# Patient Record
Sex: FEMALE | Race: WHITE | NOT HISPANIC OR LATINO | Employment: UNEMPLOYED | URBAN - METROPOLITAN AREA
[De-identification: names, ages, dates, MRNs, and addresses within clinical notes are randomized per-mention and may not be internally consistent; named-entity substitution may affect disease eponyms.]

---

## 2017-05-08 ENCOUNTER — OFFICE VISIT (OUTPATIENT)
Dept: FAMILY MEDICINE | Age: 27
End: 2017-05-08

## 2017-05-08 VITALS
TEMPERATURE: 98.2 F | HEIGHT: 65 IN | OXYGEN SATURATION: 98 % | BODY MASS INDEX: 19.32 KG/M2 | HEART RATE: 83 BPM | DIASTOLIC BLOOD PRESSURE: 62 MMHG | WEIGHT: 115.96 LBS | SYSTOLIC BLOOD PRESSURE: 98 MMHG

## 2017-05-08 DIAGNOSIS — G43.719 INTRACTABLE CHRONIC MIGRAINE WITHOUT AURA AND WITHOUT STATUS MIGRAINOSUS: ICD-10-CM

## 2017-05-08 DIAGNOSIS — M54.2 CHRONIC NECK PAIN: ICD-10-CM

## 2017-05-08 DIAGNOSIS — F41.9 ANXIETY: ICD-10-CM

## 2017-05-08 DIAGNOSIS — G89.29 CHRONIC NECK PAIN: ICD-10-CM

## 2017-05-08 DIAGNOSIS — F84.0 AUTISM SPECTRUM DISORDER: ICD-10-CM

## 2017-05-08 DIAGNOSIS — K59.09 CHRONIC CONSTIPATION: Primary | ICD-10-CM

## 2017-05-08 DIAGNOSIS — F51.04 CHRONIC INSOMNIA: ICD-10-CM

## 2017-05-08 DIAGNOSIS — Q67.5 CONGENITAL SCOLIOSIS: ICD-10-CM

## 2017-05-08 PROCEDURE — 99204 OFFICE O/P NEW MOD 45 MIN: CPT | Performed by: NURSE PRACTITIONER

## 2017-05-08 RX ORDER — VENLAFAXINE HYDROCHLORIDE 37.5 MG/1
37.5 CAPSULE, EXTENDED RELEASE ORAL DAILY
Qty: 30 CAPSULE | Refills: 1 | Status: SHIPPED | OUTPATIENT
Start: 2017-05-08 | End: 2017-07-26 | Stop reason: ALTCHOICE

## 2017-05-08 RX ORDER — AMITRIPTYLINE HYDROCHLORIDE 10 MG/1
10 TABLET, FILM COATED ORAL NIGHTLY
Qty: 30 TABLET | Refills: 0 | Status: SHIPPED | OUTPATIENT
Start: 2017-05-08 | End: 2017-05-08 | Stop reason: CLARIF

## 2017-05-15 ENCOUNTER — E-ADVICE (OUTPATIENT)
Dept: FAMILY MEDICINE | Age: 27
End: 2017-05-15

## 2017-06-06 ENCOUNTER — TELEPHONE (OUTPATIENT)
Dept: FAMILY MEDICINE | Age: 27
End: 2017-06-06

## 2017-07-26 ENCOUNTER — OFFICE VISIT (OUTPATIENT)
Dept: FAMILY MEDICINE | Age: 27
End: 2017-07-26

## 2017-07-26 VITALS
HEART RATE: 72 BPM | WEIGHT: 117.73 LBS | BODY MASS INDEX: 19.85 KG/M2 | DIASTOLIC BLOOD PRESSURE: 42 MMHG | SYSTOLIC BLOOD PRESSURE: 112 MMHG

## 2017-07-26 DIAGNOSIS — K59.09 CHRONIC CONSTIPATION: Primary | ICD-10-CM

## 2017-07-26 DIAGNOSIS — F41.9 ANXIETY: ICD-10-CM

## 2017-07-26 PROCEDURE — 99215 OFFICE O/P EST HI 40 MIN: CPT | Performed by: NURSE PRACTITIONER

## 2017-07-26 RX ORDER — BISACODYL 10 MG
10 SUPPOSITORY, RECTAL RECTAL DAILY PRN
Qty: 12 EACH | Refills: 0 | Status: SHIPPED | OUTPATIENT
Start: 2017-07-26

## 2017-07-26 RX ORDER — POLYETHYLENE GLYCOL 3350 17 G/17G
17 POWDER, FOR SOLUTION ORAL DAILY
Qty: 1 BOTTLE | Refills: 5 | Status: SHIPPED | OUTPATIENT
Start: 2017-07-26

## 2019-04-19 ENCOUNTER — HEALTH MAINTENANCE LETTER (OUTPATIENT)
Age: 29
End: 2019-04-19

## 2019-05-01 ENCOUNTER — TELEPHONE (OUTPATIENT)
Dept: SCHEDULING | Age: 29
End: 2019-05-01

## 2019-05-09 ENCOUNTER — OFFICE VISIT (OUTPATIENT)
Dept: FAMILY MEDICINE | Facility: CLINIC | Age: 29
End: 2019-05-09
Payer: MEDICAID

## 2019-05-09 VITALS
TEMPERATURE: 98.7 F | WEIGHT: 112.5 LBS | OXYGEN SATURATION: 98 % | RESPIRATION RATE: 16 BRPM | HEIGHT: 62 IN | DIASTOLIC BLOOD PRESSURE: 47 MMHG | HEART RATE: 84 BPM | BODY MASS INDEX: 20.7 KG/M2 | SYSTOLIC BLOOD PRESSURE: 106 MMHG

## 2019-05-09 DIAGNOSIS — H61.23 CERUMINOSIS, BILATERAL: Primary | ICD-10-CM

## 2019-05-09 DIAGNOSIS — Z86.69 H/O ABSENCE SEIZURES: ICD-10-CM

## 2019-05-09 DIAGNOSIS — R15.9 INCONTINENCE OF FECES, UNSPECIFIED FECAL INCONTINENCE TYPE: ICD-10-CM

## 2019-05-09 DIAGNOSIS — Z86.69 HISTORY OF MIGRAINE HEADACHES: ICD-10-CM

## 2019-05-09 RX ORDER — POLYETHYLENE GLYCOL 3350 17 G/17G
1 POWDER, FOR SOLUTION ORAL DAILY
COMMUNITY
End: 2019-07-24

## 2019-05-09 RX ORDER — BISMUTH SUBSALICYLATE 262 MG/1
1 TABLET, CHEWABLE ORAL
Status: ON HOLD | COMMUNITY
End: 2019-07-28

## 2019-05-09 SDOH — HEALTH STABILITY: MENTAL HEALTH: HOW OFTEN DO YOU HAVE A DRINK CONTAINING ALCOHOL?: NEVER

## 2019-05-09 ASSESSMENT — MIFFLIN-ST. JEOR: SCORE: 1203.31

## 2019-05-09 NOTE — NURSING NOTE
bilateral ear lavage with small  wax removed. Patient tolerated procedure well.    Falguni Patel, CMA

## 2019-05-09 NOTE — NURSING NOTE
"28 year old  Chief Complaint   Patient presents with     Ear Problem     Ringing in left ear, ongoing, constant       Blood pressure 106/47, pulse 84, temperature 98.7  F (37.1  C), temperature source Oral, resp. rate 16, height 1.582 m (5' 2.3\"), weight 51 kg (112 lb 8 oz), SpO2 98 %. Body mass index is 20.38 kg/m .  BP completed using cuff size:    There is no problem list on file for this patient.      Wt Readings from Last 2 Encounters:   05/09/19 51 kg (112 lb 8 oz)     BP Readings from Last 3 Encounters:   05/09/19 106/47       Allergies   Allergen Reactions     Seasonal Allergies        Current Outpatient Medications   Medication     bismuth subsalicylate (PEPTO BISMOL) 262 MG chewable tablet     polyethylene glycol (MIRALAX/GLYCOLAX) powder     No current facility-administered medications for this visit.        Social History     Tobacco Use     Smoking status: Never Smoker     Smokeless tobacco: Never Used   Substance Use Topics     Alcohol use: Not on file     Drug use: Not on file       Honoring Choices - Health Care Directive Guide offered to patient at time of visit.    Health Maintenance Due   Topic Date Due     PREVENTIVE CARE VISIT  1990     HIV SCREEN (SYSTEM ASSIGNED)  11/03/2009     PAP SCREENING Q3 YR (SYSTEM ASSIGNED)  11/03/2012     DTAP/TDAP/TD IMMUNIZATION (1 - Tdap) 11/03/2016     PHQ-2  01/01/2019         There is no immunization history on file for this patient.    No results found for: PAP      No lab results found.    No flowsheet data found.    No flowsheet data found.    No flowsheet data found.    No flowsheet data found.      Falguni Patel, [facility]  May 9, 2019 1:08 PM    "

## 2019-05-15 NOTE — PROGRESS NOTES
"Brianne Colunga is a 28 year old female who presents today to establish care and to discuss several concerns.  She is a FT performing arts student at the .  She recently moved into an apartment near the  with roommates, that is going fairly well.      1.)  Brianne states she has had ongoing constant ringing in her right ear, when this has happened in the past she knows that she has an accumulation of ear wax.  She has had her ears irrigated with success in the past.      2.)  She complains of frequent nausea with a history of IBS, she has episodes of alternating constipation and diarrhea and fecal incontinence.  She has had GI consult in the past, has recently moved to [place] and would like to follow up with a care provider for that. She has had an upper endoscopy and a colonoscopy, those records could be made available.      3.)  Brianne gets a migraine headache about once/week, she treats with OTC Excedrin Migraine medication.  She also has what she calls absence seizures, she is not currently using any medications for seizures, she has \"almost fainted\" but otherwise does not currently have significant concerns for the seizures.  She would like to see a neurologist to make sure that there is nothing she should be doing differently for her headaches or her seizure concerns.        Review Of Systems   ROS: 10 point ROS neg other than the symptoms noted above in the HPI.  Neurologic: History of birth trauma, absence seizures, migraine headaches  Psychiatric: Review of chart from [place]:  Autism, Aspergers, Bipolar  :  Menses every month, she has occasionally skipped a month here and there, she is not sexually active at this time.  LMP 4/16/19.  This lasted 2 days.    Past Medical History:   Diagnosis Date     Acid reflux      Irritable bowel syndrome      No past surgical history on file.  Social History     Socioeconomic History     Marital status: Single     Spouse name: Not on file     Number of children: " "Not on file     Years of education: Not on file     Highest education level: Not on file   Occupational History     Not on file   Social Needs     Financial resource strain: Not on file     Food insecurity:     Worry: Not on file     Inability: Not on file     Transportation needs:     Medical: Not on file     Non-medical: Not on file   Tobacco Use     Smoking status: Never Smoker     Smokeless tobacco: Never Used   Substance and Sexual Activity     Alcohol use: Never     Frequency: Never     Drug use: Never     Sexual activity: Not Currently     Partners: Male   Lifestyle     Physical activity:     Days per week: Not on file     Minutes per session: Not on file     Stress: Not on file   Relationships     Social connections:     Talks on phone: Not on file     Gets together: Not on file     Attends Rastafarian service: Not on file     Active member of club or organization: Not on file     Attends meetings of clubs or organizations: Not on file     Relationship status: Not on file     Intimate partner violence:     Fear of current or ex partner: Not on file     Emotionally abused: Not on file     Physically abused: Not on file     Forced sexual activity: Not on file   Other Topics Concern     Not on file   Social History Narrative     Not on file     Family History   Problem Relation Age of Onset     Irritable Bowel Syndrome Mother      Heart Disease Father      Prostate Cancer Paternal Grandfather        /47   Pulse 84   Temp 98.7  F (37.1  C) (Oral)   Resp 16   Ht 1.582 m (5' 2.3\")   Wt 51 kg (112 lb 8 oz)   SpO2 98%   BMI 20.38 kg/m      Exam:  Constitutional: healthy, alert and no distress, poor posture  Head: Normocephalic. No masses, lesions, tenderness or abnormalities  Neck: Neck supple. No adenopathy. Thyroid symmetric, normal size,, Carotids without bruits.  ENT: Cerumen noted both ear canals, greater on the right  Cardiovascular: negative, PMI normal. No lifts, heaves, or thrills. RRR. No " murmurs, clicks gallops or rub  Respiratory: negative, Percussion normal. Good diaphragmatic excursion. Lungs clear  : Deferred  Musculoskeletal: Poor posture  Skin: no suspicious lesions or rashes  Psychiatric: affect normal/bright and mentation appears abnormal in her overzealous response, constant smiling, yet when questioned very articulate and knowledgeable about her health and conditions    Assessment/Plan:  1. Ceruminosis, bilateral    - REMOVE IMPACTED CERUMEN    2. H/O absence seizures    - NEUROLOGY ADULT REFERRAL    3. Incontinence of feces, unspecified fecal incontinence type    - GASTROENTEROLOGY ADULT REF CONSULT ONLY    4. History of migraine headaches    - NEUROLOGY ADULT REFERRAL    Brianne will return to this clinic as needed.

## 2019-05-20 NOTE — TELEPHONE ENCOUNTER
FUTURE VISIT INFORMATION      FUTURE VISIT INFORMATION:    Date: 6/20/19    Time: 7:30AM    Location: [facility]  REFERRAL INFORMATION:    Referring provider:  Roint Parekh NP    Referring providers clinic:  [facility]    Reason for visit/diagnosis:  Incontinence of feces, unspecified fecal incontinence type    NOTES STATUS DETAILS   OFFICE NOTE from referring provider  Internal 5/9/19   OFFICE NOTE from other specialist   Care Everywhere 4/30/19, 4/9/19, 4/4/19, 3/29/19, 2/21/19, 2/11/19, 2/6/19, 1/29/19, 1/15/19,    DISCHARGE SUMMARY FROM HOSPITAL N/A    DISCHARGE REPORT FROM ED N/A    OPERATIVE REPORT  N/A    PFC REPORT N/A    MEDICATION LIST Internal    LABS     FIT/STOOL TESTING N/A    ANAL PAP N/A    PATHOLOGY REPORTS RELATED TO DIAGNOSIS N/A    DIAGNOSTIC PROCEDURES     COLONOSCOPY N/A    ENDOSCOPY (EGD) N/A    ERCP N/A    ANOSCOPY N/A    FLEX SIGMOIDOSCOPY N/A    IMAGING & REPORT      CT, MRI, US, XR N/A

## 2019-05-28 ENCOUNTER — OFFICE VISIT (OUTPATIENT)
Dept: ORTHOPEDICS | Facility: CLINIC | Age: 29
End: 2019-05-28
Payer: MEDICAID

## 2019-05-28 ENCOUNTER — OFFICE VISIT (OUTPATIENT)
Dept: FAMILY MEDICINE | Facility: CLINIC | Age: 29
End: 2019-05-28
Payer: MEDICAID

## 2019-05-28 VITALS
SYSTOLIC BLOOD PRESSURE: 117 MMHG | BODY MASS INDEX: 20.61 KG/M2 | WEIGHT: 112 LBS | HEART RATE: 76 BPM | HEIGHT: 62 IN | DIASTOLIC BLOOD PRESSURE: 68 MMHG | RESPIRATION RATE: 16 BRPM

## 2019-05-28 VITALS
OXYGEN SATURATION: 99 % | DIASTOLIC BLOOD PRESSURE: 84 MMHG | SYSTOLIC BLOOD PRESSURE: 120 MMHG | WEIGHT: 114.6 LBS | RESPIRATION RATE: 16 BRPM | HEIGHT: 62 IN | BODY MASS INDEX: 21.09 KG/M2 | HEART RATE: 69 BPM | TEMPERATURE: 98.6 F

## 2019-05-28 DIAGNOSIS — H10.31 ACUTE CONJUNCTIVITIS OF RIGHT EYE, UNSPECIFIED ACUTE CONJUNCTIVITIS TYPE: Primary | ICD-10-CM

## 2019-05-28 DIAGNOSIS — M25.562 ACUTE PAIN OF LEFT KNEE: Primary | ICD-10-CM

## 2019-05-28 DIAGNOSIS — H01.003 BLEPHARITIS OF EYELID OF RIGHT EYE, UNSPECIFIED EYELID, UNSPECIFIED TYPE: ICD-10-CM

## 2019-05-28 RX ORDER — SULFACETAMIDE/PREDNISOLONE 10 %-0.2 %
SUSPENSION, DROPS(FINAL DOSAGE FORM)(ML) OPHTHALMIC (EYE)
Qty: 10 ML | Refills: 0 | Status: SHIPPED | OUTPATIENT
Start: 2019-05-28 | End: 2019-07-23

## 2019-05-28 RX ORDER — TOBRAMYCIN AND DEXAMETHASONE 3; 1 MG/ML; MG/ML
1-2 SUSPENSION/ DROPS OPHTHALMIC EVERY 4 HOURS
Qty: 3 ML | Refills: 0 | Status: SHIPPED | OUTPATIENT
Start: 2019-05-28 | End: 2019-06-20

## 2019-05-28 ASSESSMENT — PAIN SCALES - GENERAL: PAINLEVEL: NO PAIN (0)

## 2019-05-28 ASSESSMENT — MIFFLIN-ST. JEOR
SCORE: 1201.04
SCORE: 1212.83

## 2019-05-28 NOTE — PROGRESS NOTES
"[facility]  Orthopedics  Antonio Cochran, DO  2019     Name: Brianne Colunga  MRN: [id number]  Age: 28 year old  : 1990  Referring provider: Referred Self     Chief Complaint: Pain of the Left Knee     Date of Injury: 19    History of Present Illness:   Brianne Colunga is a 28 year old female PMHx of autism who presents today for evaluation of left knee pain. She reports that her knee pain began today after bumping it onto a \"sturdy object.\" She heard a pop and had difficulty walking immediately afterwards. Able to ambulate however shortly thereafter.  Symptoms are made worse with walking. Describes pain as \"aching.\" She has tried ice and rest to alleviate her pain.     Review of Systems:   A 10-point review of systems was obtained and is negative except for as noted in the HPI.     Medications:     Current Outpatient Medications:      bismuth subsalicylate (PEPTO BISMOL) 262 MG chewable tablet, Take 1 tablet by mouth 4 times daily (before meals and nightly), Disp: , Rfl:      polyethylene glycol (MIRALAX/GLYCOLAX) powder, Take 1 capful by mouth daily, Disp: , Rfl:     Allergies:  Seasonal allergies    Past Medical History:  Acid reflux  Irritable bowel syndrome  Asperger's Syndrome  ADHD  Anxiety  Bipolar Disorder  Suicidal Ideations    Past Surgical History:  The patient does not have any pertinent past surgical history.    Social History:  The patient denies tobacco or alcohol use.  Occupation: Student - [employer]  Handedness: Left  Exercise: Running, bike in the weightroom    Family History:  Mother: Irritable bowel syndrome  Father: Heart disease  Paternal grandfather: Prostate cancer      Physical Examination:  Blood pressure 117/68, pulse 76, resp. rate 16, height 1.582 m (5' 2.3\"), weight 50.8 kg (112 lb).  General  - normal appearance, in no obvious distress  CV  - normal popliteal pulse  Pulm  - normal respiratory pattern, non-labored  Musculoskeletal - left knee  - stance: normal gait without " limp, normal single leg squat, no obvious leg length discrepancy  - inspection: normal bone and joint alignment, no obvious deformity. Mild swelling at the medial femoral condyle, just medial to the superior poll of the patella. Small amount of ecchymosis in this region.   - palpation: no joint line tenderness, patella and patellar tendon non-tender, Tenderness just medial and superior to the patella overlying VMO.   - ROM: 135 degrees flexion, -5 degrees extension, not painful, normal actively and passively compared to contralateral  - strength: 5/5 in flexion, 5/5 in extension  - special tests:  (-) Lachman  (-) varus at 0 and 30 degrees flexion  (-) valgus at 0 and 30 degrees flexion  Neuro  - no sensory or motor deficit, grossly normal coordination, normal muscle tone  Skin  - no ecchymosis, erythema, warmth, or induration, no obvious rash  Psych  - interactive, appropriate, normal mood and affect    Assessment:   28 year old female presenting with left anterior knee pain consistent with contusion of patella.  No XR warranted today based on examination and soft tissue location in quad.    Plan:     I recommend she treat her symptoms with a Tubigrip sleeve    I recommend she manage her pain and swelling with ice and elevation.     Tylenol/ibuprofen use    Activity as tolerated    Follow-up if her symptoms fail to improve.     I, Antonio Cochran DO, have reviewed the above note and agree with the scribe's notation as written.    Scribe Disclosure:  IBrandon, am serving as a scribe to document services personally performed by Antonio Cochran DO at this visit, based upon the provider's statements to me. All documentation has been reviewed by the aforementioned provider prior to being entered into the official medical record.

## 2019-05-28 NOTE — PATIENT INSTRUCTIONS

## 2019-05-28 NOTE — PROGRESS NOTES
SPORTS & ORTHOPEDIC WALK-IN VISIT 5/28/2019    Primary Care Physician: Dr. 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  left knee    What caused the injury /pain? Ran into something sturdy outside    How long ago did your injury occur or pain begin? today    What are your most bothersome symptoms? Pain and Swelling    How would you characterize your symptom?  aching    What makes your symptoms better? Rest and Ice    What makes your symptoms worse? Walking    Have you been previously seen for this problem? No    Medical History:    Any recent changes to your medical history? No    Any new medication prescribed since last visit? No    Have you had surgery on this body part before? No    Social History:    Occupation: Student - [employer]    Handedness: Left    Exercise: Running, bike in the weightroom    Review of Systems:    Do you have fever, chills, weight loss? No    Do you have any vision problems? No    Do you have any chest pain or edema? No    Do you have any shortness of breath or wheezing?  No    Do you have stomach problems? Sometimes, nausea    Do you have any numbness or focal weakness? No    Do you have diabetes? No    Do you have problems with bleeding or clotting? No    Do you have an rashes or other skin lesions? No

## 2019-05-28 NOTE — LETTER
"2019       RE: Brianne Colunga  [address]  [facility] [address]     Dear Colleague,    Thank you for referring your patient, Brianne Colunga, to the [facility] SPORTS AND ORTHOPAEDIC WALK IN CLINIC at [facility]. Please see a copy of my visit note below.    [facility]  Orthopedics  JaiPatrizia Cochran, DO  2019     Name: Brianne Colunga  MRN: [id number]  Age: 28 year old  : 1990  Referring provider: Referred Self     Chief Complaint: Pain of the Left Knee     Date of Injury: 19    History of Present Illness:   Brianne Colunga is a 28 year old female PMHx of autism who presents today for evaluation of left knee pain. She reports that her knee pain began today after bumping it onto a \"sturdy object.\" She heard a pop and had difficulty walking immediately afterwards. Able to ambulate however shortly thereafter.  Symptoms are made worse with walking. Describes pain as \"aching.\" She has tried ice and rest to alleviate her pain.     Review of Systems:   A 10-point review of systems was obtained and is negative except for as noted in the HPI.     Medications:     Current Outpatient Medications:      bismuth subsalicylate (PEPTO BISMOL) 262 MG chewable tablet, Take 1 tablet by mouth 4 times daily (before meals and nightly), Disp: , Rfl:      polyethylene glycol (MIRALAX/GLYCOLAX) powder, Take 1 capful by mouth daily, Disp: , Rfl:     Allergies:  Seasonal allergies    Past Medical History:  Acid reflux  Irritable bowel syndrome  Asperger's Syndrome  ADHD  Anxiety  Bipolar Disorder  Suicidal Ideations    Past Surgical History:  The patient does not have any pertinent past surgical history.    Social History:  The patient denies tobacco or alcohol use.  Occupation: Student - [employer]  Handedness: Left  Exercise: Running, bike in the weightroom    Family History:  Mother: Irritable bowel syndrome  Father: Heart disease  Paternal grandfather: Prostate " "cancer      Physical Examination:  Blood pressure 117/68, pulse 76, resp. rate 16, height 1.582 m (5' 2.3\"), weight 50.8 kg (112 lb).  General  - normal appearance, in no obvious distress  CV  - normal popliteal pulse  Pulm  - normal respiratory pattern, non-labored  Musculoskeletal - left knee  - stance: normal gait without limp, normal single leg squat, no obvious leg length discrepancy  - inspection: normal bone and joint alignment, no obvious deformity. Mild swelling at the medial femoral condyle, just medial to the superior poll of the patella. Small amount of ecchymosis in this region.   - palpation: no joint line tenderness, patella and patellar tendon non-tender, Tenderness just medial and superior to the patella overlying VMO.   - ROM: 135 degrees flexion, -5 degrees extension, not painful, normal actively and passively compared to contralateral  - strength: 5/5 in flexion, 5/5 in extension  - special tests:  (-) Lachman  (-) varus at 0 and 30 degrees flexion  (-) valgus at 0 and 30 degrees flexion  Neuro  - no sensory or motor deficit, grossly normal coordination, normal muscle tone  Skin  - no ecchymosis, erythema, warmth, or induration, no obvious rash  Psych  - interactive, appropriate, normal mood and affect    Assessment:   28 year old female presenting with left anterior knee pain consistent with contusion of patella.  No XR warranted today based on examination and soft tissue location in quad.    Plan:     I recommend she treat her symptoms with a Tubigrip sleeve    I recommend she manage her pain and swelling with ice and elevation.     Tylenol/ibuprofen use    Activity as tolerated    Follow-up if her symptoms fail to improve.     I, Antonio Cochran DO, have reviewed the above note and agree with the scribe's notation as written.    Scribe Disclosure:  IBrandon, am serving as a scribe to document services personally performed by Antonio Cochran DO at this visit, based upon the provider's " statements to me. All documentation has been reviewed by the aforementioned provider prior to being entered into the official medical record.              SPORTS & ORTHOPEDIC WALK-IN VISIT 5/28/2019    Primary Care Physician: Dr. 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  left knee    What caused the injury /pain? Ran into something sturdy outside    How long ago did your injury occur or pain begin? today    What are your most bothersome symptoms? Pain and Swelling    How would you characterize your symptom?  aching    What makes your symptoms better? Rest and Ice    What makes your symptoms worse? Walking    Have you been previously seen for this problem? No    Medical History:    Any recent changes to your medical history? No    Any new medication prescribed since last visit? No    Have you had surgery on this body part before? No    Social History:    Occupation: Student - [employer]    Handedness: Left    Exercise: Running, bike in the weightroom    Review of Systems:    Do you have fever, chills, weight loss? No    Do you have any vision problems? No    Do you have any chest pain or edema? No    Do you have any shortness of breath or wheezing?  No    Do you have stomach problems? Sometimes, nausea    Do you have any numbness or focal weakness? No    Do you have diabetes? No    Do you have problems with bleeding or clotting? No    Do you have an rashes or other skin lesions? No         Again, thank you for allowing me to participate in the care of your patient.      Sincerely,    Antonio Cochran, DO

## 2019-05-28 NOTE — NURSING NOTE
Chief Complaint   Patient presents with     Eye Problem     Pt complains of right eye discomfort for the past couple weeks.         Gurjit Haskins, EMT on 5/28/2019 at 3:36 PM

## 2019-05-28 NOTE — PROGRESS NOTES
Brianne Colunga is a 28 year old female who presents today for a painful itchy right eye.  She does not remember an injury or other contact.  Her vision is not impacted.  She noticed this about the middle of the month, and now she has crusty eyes in the morning and drainage at times, creamy colored, during the day.  No fever, no symptoms of URI, no pain.  She states she does good hand hygiene.      Review Of Systems   ROS: 10 point ROS neg other than the symptoms noted above in the HPI.      Past Medical History:   Diagnosis Date     Acid reflux      Irritable bowel syndrome      No past surgical history on file.  Social History     Socioeconomic History     Marital status: Single     Spouse name: Not on file     Number of children: Not on file     Years of education: Not on file     Highest education level: Not on file   Occupational History     Not on file   Social Needs     Financial resource strain: Not on file     Food insecurity:     Worry: Not on file     Inability: Not on file     Transportation needs:     Medical: Not on file     Non-medical: Not on file   Tobacco Use     Smoking status: Never Smoker     Smokeless tobacco: Never Used   Substance and Sexual Activity     Alcohol use: Never     Frequency: Never     Drug use: Never     Sexual activity: Not Currently     Partners: Male   Lifestyle     Physical activity:     Days per week: Not on file     Minutes per session: Not on file     Stress: Not on file   Relationships     Social connections:     Talks on phone: Not on file     Gets together: Not on file     Attends Episcopalian service: Not on file     Active member of club or organization: Not on file     Attends meetings of clubs or organizations: Not on file     Relationship status: Not on file     Intimate partner violence:     Fear of current or ex partner: Not on file     Emotionally abused: Not on file     Physically abused: Not on file     Forced sexual activity: Not on file   Other Topics Concern  "    Not on file   Social History Narrative     Not on file     Family History   Problem Relation Age of Onset     Irritable Bowel Syndrome Mother      Heart Disease Father      Prostate Cancer Paternal Grandfather        /84 (BP Location: Left arm, Patient Position: Sitting, Cuff Size: Adult Regular)   Pulse 69   Temp 98.6  F (37  C) (Oral)   Resp 16   Ht 1.582 m (5' 2.3\")   Wt 52 kg (114 lb 9.6 oz)   SpO2 99%   BMI 20.76 kg/m      Exam:  Constitutional: healthy, alert and mild distress  Head: Normocephalic. No masses, lesions, tenderness or abnormalities  ENT: Inner aspect of right eye lids, upper and lower, slightly red, conjunctiva normal.  Minimal swelling, with a very slight crack in the corner of the eyelid.  : Deferred  Psychiatric: mentation appears normal and affect normal/bright, with her current mental health issues as noted in PMH  Hematologic/Lymphatic/Immunologic: Normal cervical lymph nodes    Assessment/Plan:  1. Acute conjunctivitis of right eye, unspecified acute conjunctivitis type    - tobramycin-dexamethasone (TOBRADEX) 0.3-0.1 % ophthalmic suspension; Place 1-2 drops into the right eye every 4 hours for 5 days  Dispense: 3 mL; Refill: 0    2. Blepharitis of eyelid of right eye, unspecified eyelid, unspecified type    - tobramycin-dexamethasone (TOBRADEX) 0.3-0.1 % ophthalmic suspension; Place 1-2 drops into the right eye every 4 hours for 5 days  Dispense: 3 mL; Refill: 0    Return to clinic if symptoms persist past 3-5 days.    "

## 2019-05-31 ENCOUNTER — OFFICE VISIT (OUTPATIENT)
Dept: OPHTHALMOLOGY | Facility: CLINIC | Age: 29
End: 2019-05-31
Payer: MEDICAID

## 2019-05-31 DIAGNOSIS — H10.45 CHRONIC ALLERGIC CONJUNCTIVITIS: Primary | ICD-10-CM

## 2019-05-31 RX ORDER — OLOPATADINE HYDROCHLORIDE 1 MG/ML
1 SOLUTION/ DROPS OPHTHALMIC 2 TIMES DAILY
Qty: 1 BOTTLE | Refills: 11 | Status: SHIPPED | OUTPATIENT
Start: 2019-05-31 | End: 2019-06-06

## 2019-05-31 ASSESSMENT — VISUAL ACUITY
OD_SC+: -2
OS_SC+: -
METHOD: SNELLEN - LINEAR
OS_SC: 20/20
OD_SC: 20/20

## 2019-05-31 ASSESSMENT — TONOMETRY
IOP_METHOD: ICARE
OD_IOP_MMHG: 14
OS_IOP_MMHG: 15

## 2019-05-31 ASSESSMENT — CONF VISUAL FIELD
OD_NORMAL: 1
METHOD: COUNTING FINGERS
OS_NORMAL: 1

## 2019-05-31 ASSESSMENT — SLIT LAMP EXAM - LIDS
COMMENTS: 1+ MEIBOMIAN GLAND DYSFUNCTION
COMMENTS: 1+ MEIBOMIAN GLAND DYSFUNCTION

## 2019-05-31 ASSESSMENT — EXTERNAL EXAM - RIGHT EYE: OD_EXAM: NORMAL

## 2019-05-31 ASSESSMENT — EXTERNAL EXAM - LEFT EYE: OS_EXAM: NORMAL

## 2019-05-31 NOTE — PROGRESS NOTES
Assessment/Plan  (H10.45) Chronic allergic conjunctivitis  (primary encounter diagnosis)  Comment: Patient has tried Tobradex but reports blurred vision with this medication  Plan: olopatadine (PATANOL) 0.1 % ophthalmic solution        Discussed findings with patient. Start Patanol twice daily in both eyes. Patient ok to stop Tobradex, but was also instructed to stop rubbing her eyes as this is likely exacerbating her symptoms. Patient should try a cool compress instead. RTC if symptoms worsen.       Complete documentation of historical and exam elements from today's encounter can  be found in the full encounter summary report (not reduplicated in this progress  note). I personally obtained the chief complaint(s) and history of present illness. I  confirmed and edited as necessary the review of systems, past medical/surgical  history, family history, social history, and examination findings as documented by  others; and I examined the patient myself. I personally reviewed the relevant tests,  images, and reports as documented above. I formulated and edited as necessary the  assessment and plan and discussed the findings and management plan with the  patient and family.    Aditya Mercado, OD

## 2019-05-31 NOTE — NURSING NOTE
Chief Complaints and History of Present Illnesses   Patient presents with     Red Eye Right Eye     Chief Complaint(s) and History of Present Illness(es)     Red Eye Right Eye     Laterality: right eye    Characteristics: blood shot    Associated symptoms: eye pain and itching.  Negative for tearing    Pain scale: 0/10    Frequency: constantly    Timing: throughout the day    Duration: 3 weeks    Course: stable    Treatments tried: antibiotic drops              Comments     Right eye is red and itchy. Crusting in AM has a little red. Medial canthus is red and looks like it might be scratched. Started 3 weeks ago. Some pain when touching it. A little blurring off and on, hard to focus when reading words move around on the page. Left eye is doing well. Using Tobradex for 2 days now every 4 hours, not sure if it has helped or not. Having side effects: blurred vision with instilling (which goes away) and upset stomach and headaches/migraines.     Jen Umanzor COT 9:41 AM May 31, 2019

## 2019-06-03 ENCOUNTER — TRANSFERRED RECORDS (OUTPATIENT)
Dept: HEALTH INFORMATION MANAGEMENT | Facility: CLINIC | Age: 29
End: 2019-06-03

## 2019-06-03 PROBLEM — F32.A ANXIETY AND DEPRESSION: Status: ACTIVE | Noted: 2018-06-18

## 2019-06-03 PROBLEM — F43.22 ADJUSTMENT DISORDER WITH ANXIOUS MOOD: Status: ACTIVE | Noted: 2018-06-26

## 2019-06-03 PROBLEM — F31.9 BIPOLAR DISORDER WITH DEPRESSION (H): Status: ACTIVE | Noted: 2017-03-20

## 2019-06-03 PROBLEM — F90.0 ATTENTION DEFICIT HYPERACTIVITY DISORDER (ADHD), PREDOMINANTLY INATTENTIVE TYPE: Status: ACTIVE | Noted: 2019-02-11

## 2019-06-03 PROBLEM — R45.851 SUICIDAL IDEATIONS: Status: ACTIVE | Noted: 2018-09-21

## 2019-06-03 PROBLEM — F41.9 ANXIETY AND DEPRESSION: Status: ACTIVE | Noted: 2018-06-18

## 2019-06-04 ENCOUNTER — TRANSFERRED RECORDS (OUTPATIENT)
Dept: HEALTH INFORMATION MANAGEMENT | Facility: CLINIC | Age: 29
End: 2019-06-04

## 2019-06-14 ASSESSMENT — ENCOUNTER SYMPTOMS
HEARTBURN: 0
FATIGUE: 1
DEPRESSION: 1
HOT FLASHES: 1
STIFFNESS: 1
POLYPHAGIA: 0
MUSCLE CRAMPS: 1
DECREASED APPETITE: 1
EYE WATERING: 1
WEIGHT LOSS: 1
ABDOMINAL PAIN: 1
EYE PAIN: 1
DECREASED CONCENTRATION: 0
EYE REDNESS: 1
ALTERED TEMPERATURE REGULATION: 1
WEAKNESS: 1
BACK PAIN: 1
BOWEL INCONTINENCE: 1
MUSCLE WEAKNESS: 1
NERVOUS/ANXIOUS: 1
NAIL CHANGES: 0
RECTAL PAIN: 1
ARTHRALGIAS: 1
DECREASED LIBIDO: 0
NUMBNESS: 0
FEVER: 0
SKIN CHANGES: 0
BLOATING: 1
MYALGIAS: 1
FLANK PAIN: 0
DIARRHEA: 1
INCREASED ENERGY: 1
WEIGHT GAIN: 0
VOMITING: 0
CHILLS: 1
DIZZINESS: 1
LOSS OF CONSCIOUSNESS: 0
SEIZURES: 0
TREMORS: 0
NECK PAIN: 1
MEMORY LOSS: 0
INSOMNIA: 1
PARALYSIS: 0
NIGHT SWEATS: 0
EYE IRRITATION: 1
JAUNDICE: 0
CONSTIPATION: 1
HALLUCINATIONS: 0
NAUSEA: 1
HEADACHES: 1
HEMATURIA: 0
JOINT SWELLING: 0
POOR WOUND HEALING: 0
DOUBLE VISION: 1
DISTURBANCES IN COORDINATION: 1
DIFFICULTY URINATING: 1
POLYDIPSIA: 1
SPEECH CHANGE: 0
PANIC: 0
BLOOD IN STOOL: 0
DYSURIA: 0
TINGLING: 1

## 2019-06-20 ENCOUNTER — OFFICE VISIT (OUTPATIENT)
Dept: SURGERY | Facility: CLINIC | Age: 29
End: 2019-06-20
Attending: NURSE PRACTITIONER
Payer: COMMERCIAL

## 2019-06-20 ENCOUNTER — PRE VISIT (OUTPATIENT)
Dept: SURGERY | Facility: CLINIC | Age: 29
End: 2019-06-20

## 2019-06-20 VITALS
DIASTOLIC BLOOD PRESSURE: 55 MMHG | SYSTOLIC BLOOD PRESSURE: 103 MMHG | WEIGHT: 106.1 LBS | BODY MASS INDEX: 19.53 KG/M2 | HEART RATE: 62 BPM | OXYGEN SATURATION: 98 % | HEIGHT: 62 IN

## 2019-06-20 DIAGNOSIS — F84.0 AUTISM: ICD-10-CM

## 2019-06-20 DIAGNOSIS — R15.9 INCONTINENCE OF FECES, UNSPECIFIED FECAL INCONTINENCE TYPE: ICD-10-CM

## 2019-06-20 DIAGNOSIS — F31.9 BIPOLAR DISORDER WITH DEPRESSION (H): Primary | ICD-10-CM

## 2019-06-20 DIAGNOSIS — K59.09 OTHER CONSTIPATION: ICD-10-CM

## 2019-06-20 ASSESSMENT — PATIENT HEALTH QUESTIONNAIRE - PHQ9
SUM OF ALL RESPONSES TO PHQ QUESTIONS 1-9: 18
SUM OF ALL RESPONSES TO PHQ QUESTIONS 1-9: 18
10. IF YOU CHECKED OFF ANY PROBLEMS, HOW DIFFICULT HAVE THESE PROBLEMS MADE IT FOR YOU TO DO YOUR WORK, TAKE CARE OF THINGS AT HOME, OR GET ALONG WITH OTHER PEOPLE: VERY DIFFICULT

## 2019-06-20 ASSESSMENT — MIFFLIN-ST. JEOR: SCORE: 1174.28

## 2019-06-20 ASSESSMENT — PAIN SCALES - GENERAL: PAINLEVEL: SEVERE PAIN (7)

## 2019-06-20 NOTE — PROGRESS NOTES
"Colon and Rectal Surgery Consult Clinic Note    Date: 2019     Referring provider:  Ronit Parekh, GEOFFREY  [address] [address]     RE: Brianne Colunga  : 1990  AGATA: 2019    Brianne Colunga is a very pleasant 28 year old female with a significant past medical history of bipolar disorder, autism spectrum, IBS with constipation and diarrhea, fecal incontinence, and homelessness with a diagnosis of chronic fecal incontinence. Given these findings they were subsequently sent to the Colon and Rectal Surgery Clinic for an opinion on this and a new patient consultation.     HPI: Brianne is here for options in treating her chronic constipation and fecal incontinence. She has ongoing constipation with stool leakage daily. She states she cannot feel when she is leaking stool and this happens often in public. She denies any diarrhea. Her stools are either very hard or \"mushy\". She denies pain with bowel movements or blood in stool. She does have small amounts of blood on toilet paper after having a hard bowel movement. She has tried using a fiber supplement in the past but tries to get most of her fiber through diet currently. She also complains of urinary incontinence only when she is constipated.     She denies any anal or rectal trauma. Her last colonoscopy was done approximately  in [place]. She is unsure of the date and is unsure of findings/ follow-up instructions. She denies any anal or rectal surgeries. She has not had children.     Physical Examination:  /55 (BP Location: Left arm, Patient Position: Sitting, Cuff Size: Adult Regular)   Pulse 62   Ht 5' 2.3\"   Wt 106 lb 1.6 oz   SpO2 98%   BMI 19.22 kg/m    General: Well-nourished female. Alert and cooperative.   HEENT: Mucus membranes moist.    Perianal external examination: Exam was chaperoned by MEGHA Young student  Perianal skin: intact. Small amount of fecal incontinence perianally.   Lesions: " No.  Eversion of buttocks: There was not evidence of an anal fissure. Details: N/A.  Skin tags or external hemorrhoids: No.  Digital rectal examination: Was performed.   Sphincter tone: Good resting and squeeze tone.  Palpable lesions: No.  Other: None.  Bimanual examination: was not performed    Anoscopy: Was deferred    Assessment/Plan: We will plan to start with a fiber supplement to bulken stools and manage her fecal incontinence. Start on Citrucel once a day. After one week increase to twice a day. After one more week increase to three times a day. We provided written instructions on use of fiber supplements. We had a discussion about pelvic floor dysfunction and fecal incontinence. We will refer her to Pelvic floor PT for biofeedback training as she had improvement in symptoms with this in the past. We will also refer her to Gastroenterology for chronic constiaption. For her depression we referred her to Psychology. She can return to the clinic in one month if she is still having issues with fecal incontinence.   Brianne scored 18 on her PHQ-9 today. We discussed options for management of her depression. She states she is unsure who manages this currently. She had tried Prozac but discontinued after because of side effects. She has no suicidal ideation or homicidal ideation at this time. She is aware of the suicide crisis hotline and to visit the ED if she becomes suicidal. We discussed a referral to Psychology and she is in agreement of this.     Medical history:  Past Medical History:   Diagnosis Date     Acid reflux      Irritable bowel syndrome        Surgical history:  No past surgical history on file.    Problem list:    Patient Active Problem List    Diagnosis Date Noted     Attention deficit hyperactivity disorder (ADHD), predominantly inattentive type 02/11/2019     Priority: Medium     Moderate severity       Suicidal ideations 09/21/2018     Priority: Medium     Adjustment disorder with anxious  mood 06/26/2018     Priority: Medium     Anxiety and depression 06/18/2018     Priority: Medium     Bipolar disorder with depression (H) 03/20/2017     Priority: Medium     Generalized anxiety disorder 09/01/2015     Priority: Medium     Last Assessment & Plan:   Very anxious on exam and reports history of anxiety, depression, as well as Autism spectrum disorder.   -follow up with Sara Stevens when GI symptoms have improved to further assess.       Mild recurrent major depression (H) 09/01/2012     Priority: Medium     Asperger's syndrome 09/10/1998     Priority: Medium     Active autistic disorder 09/01/1996     Priority: Medium     Most recent assessment 3/14/2018 through  [facility], [place], [place]. Autism Spectrum Disorder Level 1       Irritable bowel syndrome 09/01/1994     Priority: Medium       Medications:  Current Outpatient Medications   Medication Sig Dispense Refill     bismuth subsalicylate (PEPTO BISMOL) 262 MG chewable tablet Take 1 tablet by mouth 4 times daily (before meals and nightly)       olopatadine (PATANOL) 0.1 % ophthalmic solution Place 1 drop into both eyes 2 times daily 1 Bottle 5     polyethylene glycol (MIRALAX/GLYCOLAX) powder Take 1 capful by mouth daily       sulfacetamide-prednisoLONE (BLEPHAMIDE) 10-0.2 % ophthalmic suspension 1 drop to right eye every 8 hours for 5 days 10 mL 0       Allergies:  Allergies   Allergen Reactions     Seasonal Allergies        Family history:  Family History   Problem Relation Age of Onset     Irritable Bowel Syndrome Mother      Heart Disease Father      Prostate Cancer Paternal Grandfather      Macular Degeneration No family hx of      Glaucoma No family hx of        Social history:  Social History     Tobacco Use     Smoking status: Never Smoker     Smokeless tobacco: Never Used   Substance Use Topics     Alcohol use: Never     Frequency: Never   Marital status: single.      Nursing Notes:   Radha Carr, EMT  6/20/2019  7:50 AM   "Signed  Chief Complaint   Patient presents with     Incontinence     Fecal incontinence.       Vitals:    06/20/19 0729   BP: 103/55   BP Location: Left arm   Patient Position: Sitting   Cuff Size: Adult Regular   Pulse: 62   SpO2: 98%   Weight: 106 lb 1.6 oz   Height: 5' 2.3\"       Body mass index is 19.22 kg/m .      ANGIE Torres                      Depression Response    Patient completed the PHQ-9 assessment for depression and scored >9? Yes  Question 9 on the PHQ-9 was positive for suicidality? Yes  Is the patient already receiving treatment for depression? Yes  Patient would like to speak with behavioral health team ([facility] only)? Yes    I personally notified the following: visit provider    Behavioral Health/Social Work Contact Information     [facility]  Ranjan Valdes MA, LMFT  Lead Behavioral Health Clinician  Phone: [phone]  [facility] Pager: [phone]    Non-[facility]  [facility] On-Call   Pager: [phone]          Total face to face time was 30 minutes, >50% counseling.    YAS Hendricks, NP-C  Colon and Rectal Surgery   [facility]    This note was created using speech recognition software and may contain unintended word substitutions.    "

## 2019-06-20 NOTE — NURSING NOTE
"Chief Complaint   Patient presents with     Incontinence     Fecal incontinence.       Vitals:    06/20/19 0729   BP: 103/55   BP Location: Left arm   Patient Position: Sitting   Cuff Size: Adult Regular   Pulse: 62   SpO2: 98%   Weight: 106 lb 1.6 oz   Height: 5' 2.3\"       Body mass index is 19.22 kg/m .      ANGIE Torres                      Depression Response    Patient completed the PHQ-9 assessment for depression and scored >9? Yes  Question 9 on the PHQ-9 was positive for suicidality? Yes  Is the patient already receiving treatment for depression? Yes  Patient would like to speak with behavioral health team ([facility] only)? Yes    I personally notified the following: visit provider    Behavioral Health/Social Work Contact Information     [facility]  Ranjan Valdes MA, LMFT  Lead Behavioral Health Clinician  Phone: [phone]  [facility] Pager: [phone]    Non-[facility]  [facility] On-Call   Pager: [phone]       "

## 2019-06-20 NOTE — LETTER
"2019       RE: Brianne Colunga  [address]  [facility] [address]     Dear Colleague,    Thank you for referring your patient, Brianne Colunga, to the [facility] COLON AND RECTAL SURGERY at [facility]. Please see a copy of my visit note below.    Colon and Rectal Surgery Consult Clinic Note    Date: 2019     Referring provider:  Ronit Parekh, GEOFFREY  [address] [place] [id number]     RE: Brianne Colunga  : 1990  AGATA: 2019    Brianne Colunga is a very pleasant 28 year old female with a significant past medical history of bipolar disorder, autism spectrum, IBS with constipation and diarrhea, fecal incontinence, and homelessness with a diagnosis of chronic fecal incontinence. Given these findings they were subsequently sent to the Colon and Rectal Surgery Clinic for an opinion on this and a new patient consultation.     HPI: Brianne is here for options in treating her chronic constipation and fecal incontinence. She has ongoing constipation with stool leakage daily. She states she cannot feel when she is leaking stool and this happens often in public. She denies any diarrhea. Her stools are either very hard or \"mushy\". She denies pain with bowel movements or blood in stool. She does have small amounts of blood on toilet paper after having a hard bowel movement. She has tried using a fiber supplement in the past but tries to get most of her fiber through diet currently. She also complains of urinary incontinence only when she is constipated.     She denies any anal or rectal trauma. Her last colonoscopy was done approximately  in [place]. She is unsure of the date and is unsure of findings/ follow-up instructions. She denies any anal or rectal surgeries. She has not had children.     Physical Examination:  /55 (BP Location: Left arm, Patient Position: Sitting, Cuff Size: Adult Regular)   Pulse 62   Ht 5' 2.3\"   Wt " 106 lb 1.6 oz   SpO2 98%   BMI 19.22 kg/m     General: Well-nourished female. Alert and cooperative.   HEENT: Mucus membranes moist.    Perianal external examination: Exam was chaperoned by MEGHA Young student  Perianal skin: intact. Small amount of fecal incontinence perianally.   Lesions: No.  Eversion of buttocks: There was not evidence of an anal fissure. Details: N/A.  Skin tags or external hemorrhoids: No.  Digital rectal examination: Was performed.   Sphincter tone: Good resting and squeeze tone.  Palpable lesions: No.  Other: None.  Bimanual examination: was not performed    Anoscopy: Was deferred    Assessment/Plan: We will plan to start with a fiber supplement to bulken stools and manage her fecal incontinence. Start on Citrucel once a day. After one week increase to twice a day. After one more week increase to three times a day. We provided written instructions on use of fiber supplements. We had a discussion about pelvic floor dysfunction and fecal incontinence. We will refer her to Pelvic floor PT for biofeedback training as she had improvement in symptoms with this in the past. We will also refer her to Gastroenterology for chronic constiaption. For her depression we referred her to Psychology. She can return to the clinic in one month if she is still having issues with fecal incontinence.   Brianne scored 18 on her PHQ-9 today. We discussed options for management of her depression. She states she is unsure who manages this currently. She had tried Prozac but discontinued after because of side effects. She has no suicidal ideation or homicidal ideation at this time. She is aware of the suicide crisis hotline and to visit the ED if she becomes suicidal. We discussed a referral to Psychology and she is in agreement of this.     Medical history:  Past Medical History:   Diagnosis Date     Acid reflux      Irritable bowel syndrome        Surgical history:  No past surgical history on  file.    Problem list:    Patient Active Problem List    Diagnosis Date Noted     Attention deficit hyperactivity disorder (ADHD), predominantly inattentive type 02/11/2019     Priority: Medium     Moderate severity       Suicidal ideations 09/21/2018     Priority: Medium     Adjustment disorder with anxious mood 06/26/2018     Priority: Medium     Anxiety and depression 06/18/2018     Priority: Medium     Bipolar disorder with depression (H) 03/20/2017     Priority: Medium     Generalized anxiety disorder 09/01/2015     Priority: Medium     Last Assessment & Plan:   Very anxious on exam and reports history of anxiety, depression, as well as Autism spectrum disorder.   -follow up with Sara Stevens when GI symptoms have improved to further assess.       Mild recurrent major depression (H) 09/01/2012     Priority: Medium     Asperger's syndrome 09/10/1998     Priority: Medium     Active autistic disorder 09/01/1996     Priority: Medium     Most recent assessment 3/14/2018 through  Psychology Clinic, [place], [place]. Autism Spectrum Disorder Level 1       Irritable bowel syndrome 09/01/1994     Priority: Medium       Medications:  Current Outpatient Medications   Medication Sig Dispense Refill     bismuth subsalicylate (PEPTO BISMOL) 262 MG chewable tablet Take 1 tablet by mouth 4 times daily (before meals and nightly)       olopatadine (PATANOL) 0.1 % ophthalmic solution Place 1 drop into both eyes 2 times daily 1 Bottle 5     polyethylene glycol (MIRALAX/GLYCOLAX) powder Take 1 capful by mouth daily       sulfacetamide-prednisoLONE (BLEPHAMIDE) 10-0.2 % ophthalmic suspension 1 drop to right eye every 8 hours for 5 days 10 mL 0       Allergies:  Allergies   Allergen Reactions     Seasonal Allergies        Family history:  Family History   Problem Relation Age of Onset     Irritable Bowel Syndrome Mother      Heart Disease Father      Prostate Cancer Paternal Grandfather      Macular Degeneration No family hx of   "    Glaucoma No family hx of        Social history:  Social History     Tobacco Use     Smoking status: Never Smoker     Smokeless tobacco: Never Used   Substance Use Topics     Alcohol use: Never     Frequency: Never   Marital status: single.      Nursing Notes:   Radha Carr EMT  6/20/2019  7:50 AM  Signed  Chief Complaint   Patient presents with     Incontinence     Fecal incontinence.       Vitals:    06/20/19 0729   BP: 103/55   BP Location: Left arm   Patient Position: Sitting   Cuff Size: Adult Regular   Pulse: 62   SpO2: 98%   Weight: 106 lb 1.6 oz   Height: 5' 2.3\"       Body mass index is 19.22 kg/m .      ANGIE Torres       Depression Response    Patient completed the PHQ-9 assessment for depression and scored >9? Yes  Question 9 on the PHQ-9 was positive for suicidality? Yes  Is the patient already receiving treatment for depression? Yes  Patient would like to speak with behavioral health team ([facility] only)? Yes    I personally notified the following: visit provider    Behavioral Health/Social Work Contact Information     [facility]  Ranjan Valdes MA, LMFT  Lead Behavioral Health Clinician  Phone: [phone]  [facility] Pager: [phone]    Non-[facility]  [facility] On-Call   Pager: [phone]     Total face to face time was 30 minutes, >50% counseling.    YAS Hendricks, NP-C  Colon and Rectal Surgery   [facility]    This note was created using speech recognition software and may contain unintended word substitutions.      "

## 2019-06-20 NOTE — PATIENT INSTRUCTIONS
1) Bulken stools with fiber (Citrucel/Benefiber). You can buy this at any drug store. Take once daily for the first week. Twice daily for the second week. Three times daily the third week and on. Continue to take the fiber supplement everyday.    Try to avoid laxatives as they cause diarrhea.    We put in a referral for the Pelvic floor clinic for biofeedback training.    We put in a referral for Psychology and Gastroenterology.

## 2019-06-28 ENCOUNTER — TELEPHONE (OUTPATIENT)
Dept: SCHEDULING | Age: 29
End: 2019-06-28

## 2019-07-10 ENCOUNTER — DOCUMENTATION ONLY (OUTPATIENT)
Dept: SURGERY | Facility: CLINIC | Age: 29
End: 2019-07-10

## 2019-07-10 DIAGNOSIS — R15.9 FECAL INCONTINENCE: Primary | ICD-10-CM

## 2019-07-10 NOTE — PROGRESS NOTES
Faxed over Biofeedback orders to pelvic floor center.   Ordered by Oly Britton NP.    Mario Peña LPN

## 2019-07-12 DIAGNOSIS — M26.629 TMJ PAIN DYSFUNCTION SYNDROME: Primary | ICD-10-CM

## 2019-07-16 ENCOUNTER — OFFICE VISIT (OUTPATIENT)
Dept: BEHAVIORAL HEALTH | Facility: CLINIC | Age: 29
End: 2019-07-16
Attending: NURSE PRACTITIONER
Payer: COMMERCIAL

## 2019-07-16 VITALS
DIASTOLIC BLOOD PRESSURE: 75 MMHG | WEIGHT: 104.6 LBS | HEART RATE: 66 BPM | SYSTOLIC BLOOD PRESSURE: 113 MMHG | BODY MASS INDEX: 18.95 KG/M2

## 2019-07-16 DIAGNOSIS — F22 PARANOIA (H): ICD-10-CM

## 2019-07-16 DIAGNOSIS — F84.0 AUTISM SPECTRUM DISORDER: ICD-10-CM

## 2019-07-16 DIAGNOSIS — G47.00 INSOMNIA, UNSPECIFIED TYPE: ICD-10-CM

## 2019-07-16 DIAGNOSIS — F39 MOOD DISORDER (H): Primary | ICD-10-CM

## 2019-07-16 RX ORDER — TRAZODONE HYDROCHLORIDE 50 MG/1
25-100 TABLET, FILM COATED ORAL
Qty: 60 TABLET | Refills: 1 | Status: ON HOLD | OUTPATIENT
Start: 2019-07-16 | End: 2019-07-30

## 2019-07-16 NOTE — NURSING NOTE
Chief Complaint   Patient presents with     Consult     bipolar and autism     Would like out of today's visit:  Help for anxiety and insomnia.  Diane Falk LPN

## 2019-07-16 NOTE — PATIENT INSTRUCTIONS
Try trazodone: May take 1/2 tablet to start, or can take up to 2 tablets nightly as needed for sleep.     If trazodone doesn't work, may try taking Benadryl 25-50mg at bedtime as needed for sleep.         Scheduling: If you have any concerns about today's visit or wish to schedule another appointment please call our office during normal business hours [phone] (8-5:00 M-F)    Contact Us: Please call [phone] during business hours (8-5:00 M-F).  If after clinic hours, or on the weekend, please call  [phone].    Financial Assistance [phone]  [employer] Billing [phone]  "[employer] Billing [phone]  Medical Records [phone]    MENTAL HEALTH CRISIS NUMBERS:  [facility]:  [facility] - [phone]  [facility] - [phone]  Walk-In Counseling Center [facility] [phone]  COPE 24/7 [place] Mobile Team - [[phone]]  [facility]is [facility] - [phone]    [facility]:  [facility] - [phone]  Walk-in counseling [facility] - [phone]  Walk-in counseling [facility] - [phone]  [facility] - [phone]  Urgent Care Adult Mental Health:   --Drop-in, 24/7 crisis line, and [facility] Mobile Team [[phone]]    24/7 CRISIS TEXT LINE: www.crisistextline.org OR text 710-431 anywhere, anytime, any crisis    Poison Control Center - [phone]  [facility] [employer] - [phone]; or [employer] - [phone]    If you have a medical emergency please call 911 or go to the nearest ER.

## 2019-07-16 NOTE — PROGRESS NOTES
Order changed to PT here for biofeedback. As received message from [facility] stating they are not excepting external referrals.

## 2019-07-16 NOTE — PROGRESS NOTES
Psychiatric Outpatient Medication Management Consultation   Brianne Colunga is a 28 year old female who was referred for consultation by Oly Sidhu for evaluation of depression on 07/16/19.   Will plan to engage in brief care of up to 3 months, with plan to transition back to the referring provider or a designated prescriber on completion of brief care.     History was provided by the patient who was a questionable historian.      Chief Complaint     Wants help with sleep and anxiety issues.      History of Present Illness    Psych critical item history includes suicidal ideation, trauma hx and psych hosp (<3).   Pertinent Background: Feels that anxiety has been an issue since childhood. Depression really became more of an issue after high school. She feels that depression has been treated at times, but not so much anxiety. Anxiety tends to go along with GI issues, has been diagnosed with IBS and suffered frequently from diarrhea and constipation throughout her life.   She does note that she has been diagnosed as high functioning ASD. Has also been diagnosed with bipolar 2 disorder in the past. Notes that she doesn't really have highs, feels that it is more lows that's she has coped with in general.   Most Recent History: Brianne notes that she has been having a lot of stomach pain and going to the bathroom a lot, and this keeps her up at night. She feels that she gets a lot of irritation and anxiety when she can't sleep.   Feels like depression and anxiety issues kind of come and go. When they happen she doesn't have much motivation at all, tends to stay home a lot and not do much.   Feels that she has been eating less recently.   She was recently hospitalized 6 weeks ago due to suicidal thoughts. Was having thoughts about going [place] the [place] at the time. She was started on fluoxetine, but noted that it seemed to be worsening dry mouth and constipation so discontinued it.   She does sometimes  "feel unsafe, mostly when somebody is following her around. She feels that recently someone has been following her around. She has seen their car a few times when she is walking outside or on the light rail. When she sees them she tries to get home as fast as she can.   Used to have panic attacks, but not in the last year.   Notes that sleep is pretty poor.     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 She is a senior currently, just one more semester yet, and then is hoping to go to grad school.   LIVING SITUATION / RELATIONSHIPS- Lives in off campus apartment.  Has one roommate currently. Was previously homeless.   SOCIAL/ SPIRITUAL SUPPORT- \"I think right now yeah\"  FEELS SAFE AT HOME- Yes, but not when alone, does get some worry when she is by herself.      Medical Review of Systems    Constipation is more of an issue generally, but sometimes has diarrhea issues as well.   Does get occasional issues with orthostasis, but she does try to lay down right away. Usually right after shower, but sometimes during it which is hard. Has felt like falling at times. Also happens if she is too hot or dehydrated, or hasn't eaten in while. Sometimes she may go a few days at a time without eating if she is really having a lot of stomach trouble.   Migraines happen every month, and may correspond to GI issues as well, may last 1-2 days. Notes that these tend to happen when not sleeping, doesn't necessarily relate to menstrual cycle.   A comprehensive review of systems was performed and is negative other than noted in the HPI.     Past Psychiatric History    SIB [method, most recent]- None  Suicide Attempt [#, recent, method]- She does feel that she came close to acting once in the past to " trying to drown herself in a lake.   Suicidal Ideation Hx [passive, active]- Currently not since last month when she was in the hospital. Suicidal thoughts usually involve non specific thoughts about wanting to drown in a lake.     Psychosis Hx- No hallucinations.   Psych Hosp [ #, most recent, committed]- A few in the past, most recently 6/2019 at [facility].   ECT [#, most recent]- None    Eating Disorder- None    Outpatient Programs [ DBT, Day Treatment, Eating Disorder Tx etc]- None     Psychiatric Medication Trials   May be incomplete history    Drug /  Start Date Dose (mg) Helpful Adverse Effects DC Reason / Date   Prozac 10  Constipation? brief   Lexapro 10      mirtazapine       trazodone       Benadryl    For allergies   hydroxyzine 25-50      gabapentin 200   Helped with HA, not sleep   melatonin 3 sometimes        Social History                [per patient report]   Financial Support- See above  Living Situation/Family/Relationships- See above. Came to [place] in 2018 because she didn't want to live so close to her parents.   Social/Spiritual Support- See above  Trauma History (self-report)- When she was a toddler she fell into the deep end of a pool. She also had an event in young adulthood where she almost choked. Has occasional nightmares and flashbacks related to past events.   Legal- None  Early History/Education- Born in [place], parents  when she was 17. Has a younger brother, 2 younger sisters, and an older step-brother.     Family History - Mom has depression and anxiety, also has ASD. Youngest sister has lower functioning ASD, lives in a group home.      Past Medical History      CARE TEAM:   PCP- Ronit Parekh  Therapist- None currently. Previously had good experience with one in [place].     Pregnant or breastfeeding:  No   Contraception- None    Neurologic Hx [head injury, seizures, etc.]: Notes that she has brief seizures that started in her 20s. Has not been worked up for this.    Patient Active Problem List   Diagnosis     Asperger's syndrome     Active autistic disorder     Adjustment disorder with anxious mood     Anxiety and depression     Attention deficit hyperactivity disorder (ADHD), predominantly inattentive type     Bipolar disorder with depression (H)     Generalized anxiety disorder     Irritable bowel syndrome     Mild recurrent major depression (H)     Suicidal ideations      Allergies    Seasonal allergies     Medications      Current Outpatient Medications   Medication Sig Dispense Refill     olopatadine (PATANOL) 0.1 % ophthalmic solution Place 1 drop into both eyes 2 times daily 1 Bottle 5     polyethylene glycol (MIRALAX/GLYCOLAX) powder Take 1 capful by mouth daily       bismuth subsalicylate (PEPTO BISMOL) 262 MG chewable tablet Take 1 tablet by mouth 4 times daily (before meals and nightly)       sulfacetamide-prednisoLONE (BLEPHAMIDE) 10-0.2 % ophthalmic suspension 1 drop to right eye every 8 hours for 5 days (Patient not taking: Reported on 7/16/2019) 10 mL 0      Physical Exam  (Vitals Only)   /75 (BP Location: Right arm, Patient Position: Sitting, Cuff Size: Adult Regular)   Pulse 66   Wt 47.4 kg (104 lb 9.6 oz)   BMI 18.95 kg/m      Pulse Readings from Last 5 Encounters:   07/16/19 66   06/20/19 62   05/28/19 69   05/28/19 76   05/09/19 84     Wt Readings from Last 5 Encounters:   07/16/19 47.4 kg (104 lb 9.6 oz)   06/20/19 48.1 kg (106 lb 1.6 oz)   05/28/19 52 kg (114 lb 9.6 oz)   05/28/19 50.8 kg (112 lb)   05/09/19 51 kg (112 lb 8 oz)     BP Readings from Last 5 Encounters:   07/16/19 113/75   06/20/19 103/55   05/28/19 120/84   05/28/19 117/68   05/09/19 106/47      Mental Status Exam   Alertness: alert  and oriented  Appearance: adequately groomed  Behavior/Demeanor: cooperative, pleasant and calm, with fair eye contact   Speech: normal and regular rate and rhythm  Language: intact and no problems  Psychomotor: some atypical movements, fidgety and  restless, possibly associated with intermittent abdominal pain.   Mood: anxious  Affect: full range; smiles broadly throughout interview, except when in pain.   Thought Process/Associations: unremarkable  Thought Content:  Reports none;  Denies suicidal ideation, violent ideation and delusions  Perception:  Reports noting that someone she doesn't know has been following her at times intermittently over the last few months;  Denies auditory hallucinations and visual hallucinations  Insight: adequate  Judgment: adequate for safety  Cognition: does  appear grossly intact; formal cognitive testing was not done  Gait and Station: unremarkable     Labs and Data      PHQ-9 SCORE 6/20/2019   PHQ-9 Total Score MyChart 18 (Moderately severe depression)   PHQ-9 Total Score 18      Assessment & Plan    Brianne Colunga is a 28 year old female who provides a history supporting the following diagnoses:  Mood disorder (Hx of BP2, MDD, SHEA)  Paranoia  Autism Spectrum Disorder    Pertinent History: Review of Brianne's chart reveals a variety of past diagnoses including BP2, MDD, SHEA, ADHD, ASD. There are some inconsistencies in her records / current report. Previous records from the psych NP that she was seeing at [facility] indicate that she came to [place] earlier in the year than she reported to me, and that she was unclear at that time as well on why she came, but that her father was supportive. Subsequent records indicate that she was homeless for some time earlier this year, and she is unable to clearly explain to me how she found housing. I also see reports that she previously graduated from college for theater arts, but she now reports that she is enrolled again in a theater arts program, possibly in grad school at this point.   When asked about trauma history, she seemed uncomfortable. She was unable to well convey whether she does experience nightmares or flashbacks / intrusive memories related to past trama, or whether she  "frequently focuses on upsetting things she experienced, which she identified as falling into a pool when she was a toddler and an episode of choking as an adult.   As far as her bipolar 2 diagnosis, there was no evidence in her current presentation, available records, or presented history to support this. More detailed records would have to be obtained to further understand her past diagnoses. Apparently she had neuropsych testing in [place] at some point.   TODAY: Today Brianne was uncomfortable due to abdominal pain she was having during the interview that she reported as having been going on for a few days, consistent with pain that she has intermittently had in the past. She was unable to convey whether this is directly linked to other IBS symptoms.   She was recently hospitalized 6 weeks ago for suicidal thoughts. She describes having thoughts about wanting to \"go into a lake in [place]\", implying that she wanted to drown at the time, but seemed to deny having any specific plan. When asked if she has ever felt close to acting, she said she did a long time ago, but also seemed to not have had any specific plan or taken any steps at that time.   Paranoia has apparently been a long time issue, with thoughts that she is being stalked. I was unable to verify from her if there is any legitimacy to this. I asked her several times if she knew the person who she thought has been following her in a car, or who they were, and she did not confirm any awareness of the identity or context for this, leading me to believe that this is likely paranoia. Per records, a previous hospitalization in [place] had determined that her paranoia was not due to delusion, but rather misinterpreting social cues.   Psychotherapy: Supportive psychotherapy would be strongly recommended for her going forward, and she is fortunately scheduled with Godwin Valdes for next week.   Pharmacotherapy: She was started on 10mg of fluoxetine, but " discontinued it after 2-3 doses due to feeling like it worsened constipation. She did not seem to be able to describe how her constipation had worsened or if it improved after discontinuing the medication.   I am not confident that we have a complete record of her past medications, but when we started to discuss medications for mood, she indicated that her top priority at this time is just to address her sleep issues. She does believe that she was on trazodone previously, but doesn't recall there being any problem with it. She is agreeable to trying it again at this time. If she has any issues with it, we may consider Benadryl as she has found this helpful as a sleep aid in the past.     [place] PRESCRIPTION MONITORING PROGRAM [] was checked today: not using controlled substances    PSYCHOTROPIC DRUG INTERACTIONS: None   MANAGEMENT:  N/A     Plan     1) PSYCHOTROPIC MEDICATIONS:  - Start trazodone 25-100mg QHS PRN   - If this doesn't work, may try Benadryl 25-50mg QHS PRN    [see the following SmartPhrase(s) for more information: None]    2) THERAPY: Psychotherapy is a primary recommendation. Currently has appointment scheduled with Godwin Valdes for next week.     3) NEXT DUE:   Labs- Routine lab monitoring is not indicated for current psychotropic medication regimen  ECG- N/A   Rating Scales- N/A    4) REFERRALS: None    5) : JAVIER  - Karen - [phone]    6) RTC: 6 weeks with Dr. Patrick for follow up, with plan for transition back to referring provider or designated prescriber within 3 months    Treatment Risk Statement:  The patient understands the risks, benefits, adverse effects and alternatives. Agrees to treatment with the capacity to do so. No medical contraindications to treatment. Agrees to call clinic for any problems. The patient understands to call 911 or go to the nearest ED if life threatening or urgent symptoms occur. Crisis numbers are provided routinely in the After Visit  Summary.       PROVIDER:  Johnny Patrick MD

## 2019-07-17 PROBLEM — F31.81 BIPOLAR II DISORDER (H): Status: ACTIVE | Noted: 2017-03-20

## 2019-07-19 ENCOUNTER — HOSPITAL ENCOUNTER (EMERGENCY)
Facility: CLINIC | Age: 29
Discharge: HOME | End: 2019-07-20
Attending: EMERGENCY MEDICINE | Admitting: EMERGENCY MEDICINE
Payer: COMMERCIAL

## 2019-07-19 VITALS
BODY MASS INDEX: 18.64 KG/M2 | DIASTOLIC BLOOD PRESSURE: 70 MMHG | OXYGEN SATURATION: 99 % | SYSTOLIC BLOOD PRESSURE: 119 MMHG | WEIGHT: 102.9 LBS | TEMPERATURE: 98 F

## 2019-07-19 DIAGNOSIS — R19.7 DIARRHEA, UNSPECIFIED TYPE: ICD-10-CM

## 2019-07-19 LAB
ALBUMIN SERPL-MCNC: 3.9 G/DL (ref 3.4–5)
ALP SERPL-CCNC: 60 U/L (ref 40–150)
ALT SERPL W P-5'-P-CCNC: 21 U/L (ref 0–50)
ANION GAP SERPL CALCULATED.3IONS-SCNC: 6 MMOL/L (ref 3–14)
AST SERPL W P-5'-P-CCNC: 14 U/L (ref 0–45)
BASOPHILS # BLD AUTO: 0 10E9/L (ref 0–0.2)
BASOPHILS NFR BLD AUTO: 0.3 %
BILIRUB SERPL-MCNC: 0.4 MG/DL (ref 0.2–1.3)
BUN SERPL-MCNC: 16 MG/DL (ref 7–30)
CALCIUM SERPL-MCNC: 9 MG/DL (ref 8.5–10.1)
CHLORIDE SERPL-SCNC: 102 MMOL/L (ref 94–109)
CO2 SERPL-SCNC: 28 MMOL/L (ref 20–32)
CREAT SERPL-MCNC: 0.71 MG/DL (ref 0.52–1.04)
DIFFERENTIAL METHOD BLD: ABNORMAL
EOSINOPHIL # BLD AUTO: 0.1 10E9/L (ref 0–0.7)
EOSINOPHIL NFR BLD AUTO: 0.8 %
ERYTHROCYTE [DISTWIDTH] IN BLOOD BY AUTOMATED COUNT: 11.4 % (ref 10–15)
GFR SERPL CREATININE-BSD FRML MDRD: >90 ML/MIN/{1.73_M2}
GLUCOSE SERPL-MCNC: 122 MG/DL (ref 70–99)
HCG SERPL QL: NEGATIVE
HCT VFR BLD AUTO: 38.3 % (ref 35–47)
HGB BLD-MCNC: 12.8 G/DL (ref 11.7–15.7)
IMM GRANULOCYTES # BLD: 0 10E9/L (ref 0–0.4)
IMM GRANULOCYTES NFR BLD: 0.3 %
LYMPHOCYTES # BLD AUTO: 2.2 10E9/L (ref 0.8–5.3)
LYMPHOCYTES NFR BLD AUTO: 17.1 %
MCH RBC QN AUTO: 31.3 PG (ref 26.5–33)
MCHC RBC AUTO-ENTMCNC: 33.4 G/DL (ref 31.5–36.5)
MCV RBC AUTO: 94 FL (ref 78–100)
MONOCYTES # BLD AUTO: 0.8 10E9/L (ref 0–1.3)
MONOCYTES NFR BLD AUTO: 6 %
NEUTROPHILS # BLD AUTO: 9.7 10E9/L (ref 1.6–8.3)
NEUTROPHILS NFR BLD AUTO: 75.5 %
NRBC # BLD AUTO: 0 10*3/UL
NRBC BLD AUTO-RTO: 0 /100
PLATELET # BLD AUTO: 253 10E9/L (ref 150–450)
POTASSIUM SERPL-SCNC: 3.2 MMOL/L (ref 3.4–5.3)
PROT SERPL-MCNC: 7.7 G/DL (ref 6.8–8.8)
RBC # BLD AUTO: 4.09 10E12/L (ref 3.8–5.2)
SODIUM SERPL-SCNC: 137 MMOL/L (ref 133–144)
WBC # BLD AUTO: 12.8 10E9/L (ref 4–11)

## 2019-07-19 PROCEDURE — 90791 PSYCH DIAGNOSTIC EVALUATION: CPT

## 2019-07-19 PROCEDURE — 96360 HYDRATION IV INFUSION INIT: CPT | Performed by: EMERGENCY MEDICINE

## 2019-07-19 PROCEDURE — 99285 EMERGENCY DEPT VISIT HI MDM: CPT | Mod: 25 | Performed by: EMERGENCY MEDICINE

## 2019-07-19 PROCEDURE — 99284 EMERGENCY DEPT VISIT MOD MDM: CPT | Mod: Z6 | Performed by: EMERGENCY MEDICINE

## 2019-07-19 PROCEDURE — 80053 COMPREHEN METABOLIC PANEL: CPT | Performed by: EMERGENCY MEDICINE

## 2019-07-19 PROCEDURE — 84703 CHORIONIC GONADOTROPIN ASSAY: CPT | Performed by: EMERGENCY MEDICINE

## 2019-07-19 PROCEDURE — 96361 HYDRATE IV INFUSION ADD-ON: CPT | Performed by: EMERGENCY MEDICINE

## 2019-07-19 PROCEDURE — 25000128 H RX IP 250 OP 636: Performed by: EMERGENCY MEDICINE

## 2019-07-19 PROCEDURE — 81001 URINALYSIS AUTO W/SCOPE: CPT | Performed by: EMERGENCY MEDICINE

## 2019-07-19 PROCEDURE — 25000132 ZZH RX MED GY IP 250 OP 250 PS 637: Performed by: EMERGENCY MEDICINE

## 2019-07-19 PROCEDURE — 25800030 ZZH RX IP 258 OP 636: Performed by: EMERGENCY MEDICINE

## 2019-07-19 PROCEDURE — 85025 COMPLETE CBC W/AUTO DIFF WBC: CPT | Performed by: EMERGENCY MEDICINE

## 2019-07-19 RX ORDER — POTASSIUM CHLORIDE 1.5 G/1.58G
20 POWDER, FOR SOLUTION ORAL ONCE
Status: COMPLETED | OUTPATIENT
Start: 2019-07-19 | End: 2019-07-19

## 2019-07-19 RX ORDER — SODIUM CHLORIDE 9 MG/ML
1000 INJECTION, SOLUTION INTRAVENOUS CONTINUOUS
Status: DISCONTINUED | OUTPATIENT
Start: 2019-07-19 | End: 2019-07-20 | Stop reason: HOSPADM

## 2019-07-19 RX ORDER — HYDROCODONE BITARTRATE AND ACETAMINOPHEN 5; 325 MG/1; MG/1
1 TABLET ORAL ONCE
Status: COMPLETED | OUTPATIENT
Start: 2019-07-19 | End: 2019-07-19

## 2019-07-19 RX ADMIN — SODIUM CHLORIDE 1000 ML: 9 INJECTION, SOLUTION INTRAVENOUS at 21:50

## 2019-07-19 RX ADMIN — SODIUM CHLORIDE 1000 ML: 9 INJECTION, SOLUTION INTRAVENOUS at 20:43

## 2019-07-19 RX ADMIN — POTASSIUM CHLORIDE 20 MEQ: 1.5 POWDER, FOR SOLUTION ORAL at 20:42

## 2019-07-19 RX ADMIN — HYDROCODONE BITARTRATE AND ACETAMINOPHEN 1 TABLET: 5; 325 TABLET ORAL at 21:13

## 2019-07-19 ASSESSMENT — ENCOUNTER SYMPTOMS
CONFUSION: 0
HEADACHES: 0
SHORTNESS OF BREATH: 0
DIFFICULTY URINATING: 0
COLOR CHANGE: 0
DIARRHEA: 1
NECK STIFFNESS: 0
ABDOMINAL PAIN: 1
FEVER: 0
EYE REDNESS: 0
FREQUENCY: 1
ARTHRALGIAS: 0

## 2019-07-19 NOTE — ED AVS SNAPSHOT
[facility], [place], Emergency Department  [address] [place] [id number]  Phone:  [phone]                                    Brianne Colunga   MRN: [id number]    Department:  [facility], Emergency Department   Date of Visit:  7/19/2019           After Visit Summary Signature Page    I have received my discharge instructions, and my questions have been answered. I have discussed any challenges I see with this plan with the nurse or doctor.    ..........................................................................................................................................  Patient/Patient Representative Signature      ..........................................................................................................................................  Patient Representative Print Name and Relationship to Patient    ..................................................               ................................................  Date                                   Time    ..........................................................................................................................................  Reviewed by Signature/Title    ...................................................              ..............................................  Date                                               Time          22EPIC Rev 08/18

## 2019-07-19 NOTE — ED TRIAGE NOTES
"BIBA with c/o diarrhea, nausea and abdominal pain for one week. Per pt she ate eggs one week ago which gave her these symptoms. Pt also reports she gets IBS. Vitals stable. During the triage suicide screening pt reported that she had had suicidal thought and intent with a plan. Pt reports she was having suicidal thoughts earlier today. Pt reports her plan is \"to take all of my medication, i meal like all of it.\"  "

## 2019-07-19 NOTE — ED PROVIDER NOTES
[place] EMERGENCY DEPARTMENT ([facility])  7/19/19    History     Chief Complaint   Patient presents with     Diarrhea     History was provided by the patient.      Brianne Colunga is a 28 year old female with a history of autism, bipolar II disorder, ADD  and IBS who presents to the Emergency Department for an evaluation of diarrhea.  Patient complains of diarrhea, nausea and abdominal pain for 1 week.  She attributes her symptoms to eating undercooked eggs.  Patient denies vomiting or roommates experiencing similar symptoms.  She mentioned a history of UTIs and reports increased urinary frequency.  Yesterday, patient had thoughts of harming herself by taking all of her medication at one time.  She reports having similar thoughts last year.  She denies changes in her home medication. No other symptoms noted.    Past Medical History:   Diagnosis Date     Acid reflux      Irritable bowel syndrome        History reviewed. No pertinent surgical history.    Family History   Problem Relation Age of Onset     Irritable Bowel Syndrome Mother      Heart Disease Father      Prostate Cancer Paternal Grandfather      Macular Degeneration No family hx of      Glaucoma No family hx of        Social History     Tobacco Use     Smoking status: Never Smoker     Smokeless tobacco: Never Used   Substance Use Topics     Alcohol use: Never     Frequency: Never        Allergies   Allergen Reactions     Seasonal Allergies      No current facility-administered medications for this encounter.      Current Outpatient Medications   Medication     bismuth subsalicylate (PEPTO BISMOL) 262 MG chewable tablet     olopatadine (PATANOL) 0.1 % ophthalmic solution     polyethylene glycol (MIRALAX/GLYCOLAX) powder     sulfacetamide-prednisoLONE (BLEPHAMIDE) 10-0.2 % ophthalmic suspension     traZODone (DESYREL) 50 MG tablet       I have reviewed the Medications, Allergies, Past Medical and Surgical History, and Social History in the Epic  system.    Review of Systems   Constitutional: Negative for fever.   HENT: Negative for congestion.    Eyes: Negative for redness.   Respiratory: Negative for shortness of breath.    Cardiovascular: Negative for chest pain.   Gastrointestinal: Positive for abdominal pain and diarrhea.   Genitourinary: Positive for frequency. Negative for difficulty urinating.   Musculoskeletal: Negative for arthralgias and neck stiffness.   Skin: Negative for color change.   Neurological: Negative for headaches.   Psychiatric/Behavioral: Positive for suicidal ideas. Negative for confusion.   All other systems reviewed and are negative.      Physical Exam   BP: 118/66  Heart Rate: 78  Temp: 98  F (36.7  C)  Weight: 46.7 kg (102 lb 14.4 oz)  SpO2: 96 %      Physical Exam   Constitutional: She is oriented to person, place, and time. She appears well-developed and well-nourished. No distress.   HENT:   Head: Normocephalic and atraumatic.   Mouth/Throat: No oropharyngeal exudate.   Eyes: Pupils are equal, round, and reactive to light. Right eye exhibits no discharge. Left eye exhibits no discharge. No scleral icterus.   Neck: Normal range of motion. Neck supple.   Cardiovascular: Normal rate, regular rhythm, normal heart sounds and intact distal pulses. Exam reveals no gallop and no friction rub.   No murmur heard.  Pulmonary/Chest: Effort normal and breath sounds normal. No respiratory distress. She has no wheezes. She exhibits no tenderness.   Abdominal: Soft. Bowel sounds are normal. She exhibits no distension. There is tenderness (Minor).   Musculoskeletal: Normal range of motion. She exhibits no edema, tenderness or deformity.   Neurological: She is alert and oriented to person, place, and time. No cranial nerve deficit.   Skin: Skin is warm and dry. No rash noted. She is not diaphoretic. No erythema. No pallor.   Psychiatric: Her mood appears anxious. She is withdrawn. She expresses suicidal ideation. She expresses suicidal plans.    Nursing note and vitals reviewed.      ED Course        Procedures             Critical Care time:  none             Labs Ordered and Resulted from Time of ED Arrival Up to the Time of Departure from the ED - No data to display         Assessments & Plan (with Medical Decision Making)   Is a 28-year-old female with history of autism spectrum disorder who presents with 1 week of diarrhea.  This is been ongoing and she also notes slight generalized abdominal pain.  Initial differential is broad and includes but is not limited to gastroenteritis, colitis, inflammatory bowel disease, infectious diarrhea, or other etiologies.  She states this may be related to some food that she had recently.  She states that her roommates do not have any similar symptoms.  Patient also notes recent suicidal ideations with plan to take all of her medications, .  She has had similar suicidal ideas in the past.  On exam she has minor abdominal tenderness.  Lab work shows a WBC count of 12.8 as well as a potassium of 3.2.  UA shows no acute abnormality.  I discussed all results with the patient.  Potassium was replaced and patient was given IV fluids.  Patient was evaluated by the DEC .  Patient contracts for safety and states that she she has had recent suicidal ideation currently does not.  Patient was given resources regarding this.  No homicidal ideation.  I believe patient is currently safe for discharge. Will discharge home with return precautions. Discussed reasons to return to the emergency department.  Patient understands and agrees with this plan.    I have reviewed the nursing notes.    I have reviewed the findings, diagnosis, plan and need for follow up with the patient.       Medication List      There are no discharge medications for this visit.         Final diagnoses:   None     IAlexander, am serving as a trained medical scribe to document services personally performed by Raul Mckeon DO, based on the  provider's statements to me.      I, Raul Mckeon DO, was physically present and have reviewed and verified the accuracy of this note documented by Alexander Mathew.     7/19/2019   [facility], [place], EMERGENCY DEPARTMENT     Raul Mckeon DO  07/20/19 1914

## 2019-07-20 LAB
ALBUMIN UR-MCNC: NEGATIVE MG/DL
APPEARANCE UR: CLEAR
BILIRUB UR QL STRIP: NEGATIVE
COLOR UR AUTO: YELLOW
GLUCOSE UR STRIP-MCNC: NEGATIVE MG/DL
HGB UR QL STRIP: ABNORMAL
KETONES UR STRIP-MCNC: 40 MG/DL
LEUKOCYTE ESTERASE UR QL STRIP: NEGATIVE
MUCOUS THREADS #/AREA URNS LPF: PRESENT /LPF
NITRATE UR QL: NEGATIVE
PH UR STRIP: 7 PH (ref 5–7)
RBC #/AREA URNS AUTO: 5 /HPF (ref 0–2)
SOURCE: ABNORMAL
SP GR UR STRIP: 1.02 (ref 1–1.03)
SQUAMOUS #/AREA URNS AUTO: 1 /HPF (ref 0–1)
UROBILINOGEN UR STRIP-MCNC: 2 MG/DL (ref 0–2)
WBC #/AREA URNS AUTO: <1 /HPF (ref 0–5)

## 2019-07-20 NOTE — ED NOTES
I have performed an in person assessment of the patient. Based on this assessment the patient no longer requires a one on one attendant at this point in time.    Raul Mckeon,   8:47 PM  July 19, 2019         Raul Mckeon,   07/19/19 2047

## 2019-07-23 ENCOUNTER — HOSPITAL ENCOUNTER (EMERGENCY)
Facility: CLINIC | Age: 29
Discharge: HOME | End: 2019-07-24
Attending: EMERGENCY MEDICINE | Admitting: EMERGENCY MEDICINE
Payer: COMMERCIAL

## 2019-07-23 ENCOUNTER — TELEPHONE (OUTPATIENT)
Dept: FAMILY MEDICINE | Facility: CLINIC | Age: 29
End: 2019-07-23

## 2019-07-23 DIAGNOSIS — K59.00 CONSTIPATION, UNSPECIFIED CONSTIPATION TYPE: ICD-10-CM

## 2019-07-23 DIAGNOSIS — R19.7 DIARRHEA, UNSPECIFIED TYPE: ICD-10-CM

## 2019-07-23 DIAGNOSIS — K58.2 IRRITABLE BOWEL SYNDROME WITH BOTH CONSTIPATION AND DIARRHEA: ICD-10-CM

## 2019-07-23 PROCEDURE — 99284 EMERGENCY DEPT VISIT MOD MDM: CPT | Mod: Z6 | Performed by: EMERGENCY MEDICINE

## 2019-07-23 PROCEDURE — 99284 EMERGENCY DEPT VISIT MOD MDM: CPT | Mod: 25 | Performed by: EMERGENCY MEDICINE

## 2019-07-23 NOTE — ED AVS SNAPSHOT
[facility], [place], Emergency Department  [address]  [facility] [place] [address]  Phone:  [phone]  Fax:  [phone]                                    Brianne Colunga   MRN: [id number]    Department:  [facility], Emergency Department   Date of Visit:  7/23/2019           After Visit Summary Signature Page    I have received my discharge instructions, and my questions have been answered. I have discussed any challenges I see with this plan with the nurse or doctor.    ..........................................................................................................................................  Patient/Patient Representative Signature      ..........................................................................................................................................  Patient Representative Print Name and Relationship to Patient    ..................................................               ................................................  Date                                   Time    ..........................................................................................................................................  Reviewed by Signature/Title    ...................................................              ..............................................  Date                                               Time          22EPIC Rev 08/18

## 2019-07-24 ENCOUNTER — APPOINTMENT (OUTPATIENT)
Dept: CT IMAGING | Facility: CLINIC | Age: 29
End: 2019-07-24
Attending: EMERGENCY MEDICINE
Payer: COMMERCIAL

## 2019-07-24 VITALS
TEMPERATURE: 98.2 F | OXYGEN SATURATION: 95 % | RESPIRATION RATE: 16 BRPM | HEART RATE: 77 BPM | BODY MASS INDEX: 19.74 KG/M2 | DIASTOLIC BLOOD PRESSURE: 72 MMHG | WEIGHT: 109 LBS | SYSTOLIC BLOOD PRESSURE: 111 MMHG

## 2019-07-24 LAB
ALBUMIN SERPL-MCNC: 3.2 G/DL (ref 3.4–5)
ALP SERPL-CCNC: 65 U/L (ref 40–150)
ALT SERPL W P-5'-P-CCNC: 17 U/L (ref 0–50)
ANION GAP SERPL CALCULATED.3IONS-SCNC: 6 MMOL/L (ref 3–14)
AST SERPL W P-5'-P-CCNC: 10 U/L (ref 0–45)
BASOPHILS # BLD AUTO: 0 10E9/L (ref 0–0.2)
BASOPHILS NFR BLD AUTO: 0.1 %
BILIRUB SERPL-MCNC: 0.3 MG/DL (ref 0.2–1.3)
BUN SERPL-MCNC: 17 MG/DL (ref 7–30)
CALCIUM SERPL-MCNC: 8.5 MG/DL (ref 8.5–10.1)
CHLORIDE SERPL-SCNC: 103 MMOL/L (ref 94–109)
CO2 SERPL-SCNC: 31 MMOL/L (ref 20–32)
CREAT SERPL-MCNC: 0.66 MG/DL (ref 0.52–1.04)
DIFFERENTIAL METHOD BLD: ABNORMAL
EOSINOPHIL # BLD AUTO: 0.2 10E9/L (ref 0–0.7)
EOSINOPHIL NFR BLD AUTO: 1.1 %
ERYTHROCYTE [DISTWIDTH] IN BLOOD BY AUTOMATED COUNT: 11.8 % (ref 10–15)
GFR SERPL CREATININE-BSD FRML MDRD: >90 ML/MIN/{1.73_M2}
GLUCOSE SERPL-MCNC: 124 MG/DL (ref 70–99)
HCG SERPL QL: NEGATIVE
HCT VFR BLD AUTO: 34.7 % (ref 35–47)
HGB BLD-MCNC: 12 G/DL (ref 11.7–15.7)
IMM GRANULOCYTES # BLD: 0 10E9/L (ref 0–0.4)
IMM GRANULOCYTES NFR BLD: 0.1 %
LIPASE SERPL-CCNC: 132 U/L (ref 73–393)
LYMPHOCYTES # BLD AUTO: 3.3 10E9/L (ref 0.8–5.3)
LYMPHOCYTES NFR BLD AUTO: 23 %
MCH RBC QN AUTO: 32.1 PG (ref 26.5–33)
MCHC RBC AUTO-ENTMCNC: 34.6 G/DL (ref 31.5–36.5)
MCV RBC AUTO: 93 FL (ref 78–100)
MONOCYTES # BLD AUTO: 1.1 10E9/L (ref 0–1.3)
MONOCYTES NFR BLD AUTO: 7.3 %
NEUTROPHILS # BLD AUTO: 9.9 10E9/L (ref 1.6–8.3)
NEUTROPHILS NFR BLD AUTO: 68.4 %
NRBC # BLD AUTO: 0 10*3/UL
NRBC BLD AUTO-RTO: 0 /100
PLATELET # BLD AUTO: 271 10E9/L (ref 150–450)
POTASSIUM SERPL-SCNC: 3.1 MMOL/L (ref 3.4–5.3)
PROT SERPL-MCNC: 7 G/DL (ref 6.8–8.8)
RBC # BLD AUTO: 3.74 10E12/L (ref 3.8–5.2)
SODIUM SERPL-SCNC: 140 MMOL/L (ref 133–144)
WBC # BLD AUTO: 14.5 10E9/L (ref 4–11)

## 2019-07-24 PROCEDURE — 25000132 ZZH RX MED GY IP 250 OP 250 PS 637: Performed by: EMERGENCY MEDICINE

## 2019-07-24 PROCEDURE — 84703 CHORIONIC GONADOTROPIN ASSAY: CPT | Performed by: EMERGENCY MEDICINE

## 2019-07-24 PROCEDURE — 25800030 ZZH RX IP 258 OP 636: Performed by: EMERGENCY MEDICINE

## 2019-07-24 PROCEDURE — 96374 THER/PROPH/DIAG INJ IV PUSH: CPT | Mod: 59 | Performed by: EMERGENCY MEDICINE

## 2019-07-24 PROCEDURE — 25000128 H RX IP 250 OP 636: Performed by: EMERGENCY MEDICINE

## 2019-07-24 PROCEDURE — 25000125 ZZHC RX 250: Performed by: EMERGENCY MEDICINE

## 2019-07-24 PROCEDURE — 85025 COMPLETE CBC W/AUTO DIFF WBC: CPT | Performed by: EMERGENCY MEDICINE

## 2019-07-24 PROCEDURE — 74177 CT ABD & PELVIS W/CONTRAST: CPT

## 2019-07-24 PROCEDURE — 96361 HYDRATE IV INFUSION ADD-ON: CPT | Performed by: EMERGENCY MEDICINE

## 2019-07-24 PROCEDURE — 80053 COMPREHEN METABOLIC PANEL: CPT | Performed by: EMERGENCY MEDICINE

## 2019-07-24 PROCEDURE — 83690 ASSAY OF LIPASE: CPT | Performed by: EMERGENCY MEDICINE

## 2019-07-24 RX ORDER — IOPAMIDOL 755 MG/ML
100 INJECTION, SOLUTION INTRAVASCULAR ONCE
Status: COMPLETED | OUTPATIENT
Start: 2019-07-24 | End: 2019-07-24

## 2019-07-24 RX ORDER — ONDANSETRON 4 MG/1
4 TABLET, ORALLY DISINTEGRATING ORAL EVERY 8 HOURS PRN
Qty: 10 TABLET | Refills: 0 | Status: ON HOLD | OUTPATIENT
Start: 2019-07-24 | End: 2019-07-28

## 2019-07-24 RX ORDER — POLYETHYLENE GLYCOL 3350 17 G/17G
17 POWDER, FOR SOLUTION ORAL DAILY
Qty: 255 G | Refills: 0 | Status: ON HOLD | OUTPATIENT
Start: 2019-07-24 | End: 2019-07-28

## 2019-07-24 RX ORDER — SODIUM CHLORIDE 9 MG/ML
1000 INJECTION, SOLUTION INTRAVENOUS CONTINUOUS
Status: DISCONTINUED | OUTPATIENT
Start: 2019-07-24 | End: 2019-07-24 | Stop reason: HOSPADM

## 2019-07-24 RX ORDER — ONDANSETRON 2 MG/ML
4 INJECTION INTRAMUSCULAR; INTRAVENOUS EVERY 30 MIN PRN
Status: DISCONTINUED | OUTPATIENT
Start: 2019-07-24 | End: 2019-07-24 | Stop reason: HOSPADM

## 2019-07-24 RX ORDER — LIDOCAINE 40 MG/G
CREAM TOPICAL
Status: DISCONTINUED | OUTPATIENT
Start: 2019-07-24 | End: 2019-07-24 | Stop reason: HOSPADM

## 2019-07-24 RX ADMIN — ONDANSETRON 4 MG: 2 INJECTION INTRAMUSCULAR; INTRAVENOUS at 00:38

## 2019-07-24 RX ADMIN — SODIUM CHLORIDE 1000 ML: 9 INJECTION, SOLUTION INTRAVENOUS at 00:38

## 2019-07-24 RX ADMIN — SODIUM CHLORIDE 1000 ML: 9 INJECTION, SOLUTION INTRAVENOUS at 01:19

## 2019-07-24 RX ADMIN — IOPAMIDOL 53 ML: 755 INJECTION, SOLUTION INTRAVENOUS at 05:36

## 2019-07-24 RX ADMIN — SODIUM CHLORIDE 53 ML: 9 INJECTION, SOLUTION INTRAVENOUS at 05:36

## 2019-07-24 RX ADMIN — MAGNESIUM HYDROXIDE 30 ML: 400 SUSPENSION ORAL at 06:47

## 2019-07-24 ASSESSMENT — ENCOUNTER SYMPTOMS
NAUSEA: 1
VOMITING: 0
DIARRHEA: 1
SLEEP DISTURBANCE: 1
BLOOD IN STOOL: 0

## 2019-07-24 NOTE — DISCHARGE INSTRUCTIONS
Drink plenty of fluids.   Eat a high fiber diet.  Follow up with your primary care clinic provider this week.  Take ondansetron Zofran as needed for nausea.

## 2019-07-24 NOTE — ED NOTES
Bed: ED05  Expected date:   Expected time:   Means of arrival:   Comments:  Josef 89  28y F  Diarrhea, abd pain

## 2019-07-24 NOTE — ED PROVIDER NOT[place]
[facility] [place]    7/23/19     ED 5 12:19 AM   History     Chief Complaint   Patient presents with     Nausea     Diarrhea     The history is provided by the patient and medical records.     Brianne Colunga is a 28 year old female with history of autism, bipolar 2 disorder, and IBS who presents with frequent diarrhea for the past 2 weeks. She has been having fecal leakage as well. She has not been able to sleep due to diarrhea, feels really tired at this time. She has some abdominal pain with this. Abdominal pain is waxing and waning, does have abdominal pain at this time. No bloody stools. She has fevers at home. She has nausea but no vomiting. She is able to eat sometimes. She has had diarrhea in the past. She denies any recent travel, no bad food ingestion. She notes having had antibiotics last month, otherwise no other antibiotic course. Periods are normal and regular.  No suicidal or homicidal ideation.    Epic records reviewed, she was just seen in the ER 5 days ago for similar complaints of recurrent diarrhea.  She has anxiety issues that tend to go hand-in-hand with her GI issues.  She has had diarrhea and constipation throughout her life.    Wt Readings from Last 10 Encounters:   07/30/19 49 kg (108 lb)   07/23/19 49.4 kg (109 lb)   07/19/19 46.7 kg (102 lb 14.4 oz)   07/16/19 47.4 kg (104 lb 9.6 oz)   06/20/19 48.1 kg (106 lb 1.6 oz)   05/28/19 52 kg (114 lb 9.6 oz)   05/28/19 50.8 kg (112 lb)   05/09/19 51 kg (112 lb 8 oz)     I have reviewed the Medications, Allergies, Past Medical and Surgical History, and Social History in the [facility] system.  PAST MEDICAL HISTORY:   Past Medical History:   Diagnosis Date     Acid reflux      Anxiety      Depressive disorder      Irritable bowel syndrome     with chronic constipation and fecal incontinence      Scoliosis        PAST SURGICAL HISTORY:   Past Surgical History:   Procedure Laterality Date     COLONOSCOPY         FAMILY  HISTORY:   Family History   Problem Relation Age of Onset     Irritable Bowel Syndrome Mother      Depression Mother      Anxiety Disorder Mother      Autism Spectrum Disorder Mother      Heart Disease Father      Prostate Cancer Paternal Grandfather      Autism Spectrum Disorder Sister         Low functioning Autism, lives in a group home     Macular Degeneration No family hx of      Glaucoma No family hx of        SOCIAL HISTORY:   Social History     Tobacco Use     Smoking status: Never Smoker     Smokeless tobacco: Never Used   Substance Use Topics     Alcohol use: Never     Frequency: Never       Discharge Medication List as of 7/24/2019  7:09 AM      START taking these medications    Details   ondansetron (ZOFRAN ODT) 4 MG ODT tab Take 1 tablet (4 mg) by mouth every 8 hours as needed for nausea, Disp-10 tablet, R-0, Local Print         CONTINUE these medications which have CHANGED    Details   polyethylene glycol (MIRALAX/GLYCOLAX) powder Take 17 g by mouth daily, Disp-255 g, R-0, Local Print         CONTINUE these medications which have NOT CHANGED    Details   traZODone (DESYREL) 50 MG tablet Take 0.5-2 tablets ( mg) by mouth nightly as needed for sleep, Disp-60 tablet, R-1, E-Prescribe      bismuth subsalicylate (PEPTO BISMOL) 262 MG chewable tablet Take 1 tablet by mouth 4 times daily (before meals and nightly), Historical                Allergies   Allergen Reactions     Seasonal Allergies       Review of Systems   Constitutional: Positive for appetite change. Negative for chills, diaphoresis, fatigue, fever and unexpected weight change.   HENT: Negative for congestion, mouth sores, rhinorrhea and sore throat.    Eyes: Negative for visual disturbance.   Respiratory: Negative for cough, chest tightness and shortness of breath.    Cardiovascular: Negative for chest pain, palpitations and leg swelling.   Gastrointestinal: Positive for abdominal pain, constipation, diarrhea and nausea. Negative for  abdominal distention, blood in stool and vomiting.   Genitourinary: Negative for difficulty urinating, dysuria, flank pain and pelvic pain.   Musculoskeletal: Negative for arthralgias, back pain, myalgias, neck pain and neck stiffness.   Skin: Negative for rash.   Allergic/Immunologic: Negative for immunocompromised state.   Neurological: Negative for dizziness, syncope, weakness, light-headedness and headaches.   Hematological: Does not bruise/bleed easily.   Psychiatric/Behavioral: Positive for sleep disturbance. Negative for confusion and suicidal ideas.   All other systems reviewed and are negative.      Physical Exam   BP: 120/59  Pulse: 82  Temp: 98.4  F (36.9  C)  Resp: 16  Weight: 49.4 kg (109 lb)  SpO2: 95 %      Physical Exam   Constitutional: She is oriented to person, place, and time. She appears well-developed and well-nourished. No distress.   HENT:   Head: Normocephalic and atraumatic.   Mouth/Throat: Oropharynx is clear and moist.   Eyes: Pupils are equal, round, and reactive to light. Conjunctivae and EOM are normal.   Neck: Normal range of motion. Neck supple.   Cardiovascular: Regular rhythm and normal heart sounds.   Pulmonary/Chest: Breath sounds normal. No respiratory distress. She exhibits no tenderness.   Abdominal: Soft. Normal appearance and bowel sounds are normal. She exhibits no distension and no mass. There is generalized tenderness. There is no rigidity, no rebound, no guarding and no CVA tenderness.   Musculoskeletal: Normal range of motion. She exhibits no edema or tenderness.        Cervical back: She exhibits no tenderness.        Thoracic back: She exhibits no tenderness.        Lumbar back: She exhibits no tenderness.   Neurological: She is alert and oriented to person, place, and time.   Skin: Skin is warm and dry.   Psychiatric: Her mood appears anxious.       ED Course        Procedures             Critical Care time:  none             Labs Ordered and Resulted from Time of ED  Arrival Up to the Time of Departure from the ED   CBC WITH PLATELETS DIFFERENTIAL - Abnormal; Notable for the following components:       Result Value    WBC 14.5 (*)     RBC Count 3.74 (*)     Hematocrit 34.7 (*)     Absolute Neutrophil 9.9 (*)     All other components within normal limits   COMPREHENSIVE METABOLIC PANEL - Abnormal; Notable for the following components:    Potassium 3.1 (*)     Glucose 124 (*)     Albumin 3.2 (*)     All other components within normal limits   LIPASE   HCG QUALITATIVE   PERIPHERAL IV CATHETER     CT Abdomen Pelvis w Contrast   Final Result   IMPRESSION:   1. A very large amount of stool distending the rectum and sigmoid   colon with moderate stool throughout the remaining colon.   2. Mild nonspecific rectal wall thickening may be   proctitis/inflammation or reactive change.   3. Slight urinary bladder distention.      EVELINE TODD MD             Assessments & Plan (with Medical Decision Making)   Constipation, diarrhea, irritable bowel syndrome. Differential diagnosis includes colitis, acid peptic disease, diverticular disease, cardiovascular disorders,  disorders, infectious diarrhea, inflammatory bowel disease, other. Improved with treatment. No evidence of acute surgical abdomen. Clinic follow up this week. Return within 24 hours if still having pain.    I have reviewed the nursing notes.    I have reviewed the findings, diagnosis, plan and need for follow up with the patient.       Medication List      Discontinued    polyethylene glycol powder  Commonly known as:  MIRALAX/GLYCOLAX            Final diagnoses:   Constipation, unspecified constipation type   Diarrhea, unspecified type   Irritable bowel syndrome with both constipation and diarrhea     ITracy, am serving as a trained medical scribe to document services personally performed by Shree Casillas MD based on the provider's statements to me on July 24, 2019.  This document has been checked and approved  by the attending provider.    I, Shree Barriga MD, was physically present and have reviewed and verified the accuracy of this note documented by Tracy Silveira, medical scribe.     7/23/2019   [facility], [place], EMERGENCY DEPARTMENT     Shree Barriga MD  08/07/19 0118

## 2019-07-24 NOTE — ED NOTES
Pants/scrub and pull-ups was offered. Patient refused to change her pants but took the pull ups and wipes.

## 2019-07-26 ENCOUNTER — HOSPITAL ENCOUNTER (INPATIENT)
Facility: CLINIC | Age: 29
LOS: 3 days | Discharge: HOME | DRG: 882 | End: 2019-07-30
Attending: PSYCHIATRY & NEUROLOGY | Admitting: PSYCHIATRY & NEUROLOGY
Payer: COMMERCIAL

## 2019-07-26 DIAGNOSIS — Z63.9 RELATIONSHIP PROBLEMS: ICD-10-CM

## 2019-07-26 DIAGNOSIS — F84.0 AUTISM: ICD-10-CM

## 2019-07-26 DIAGNOSIS — F84.0 AUTISTIC DISORDER, RESIDUAL STATE: ICD-10-CM

## 2019-07-26 DIAGNOSIS — F90.0 ATTENTION DEFICIT HYPERACTIVITY DISORDER, INATTENTIVE TYPE: ICD-10-CM

## 2019-07-26 DIAGNOSIS — K58.1 IRRITABLE BOWEL SYNDROME WITH CONSTIPATION: Primary | ICD-10-CM

## 2019-07-26 DIAGNOSIS — F33.0 MAJOR DEPRESSIVE DISORDER, RECURRENT EPISODE, MILD (H): ICD-10-CM

## 2019-07-26 DIAGNOSIS — F41.1 GENERALIZED ANXIETY DISORDER: ICD-10-CM

## 2019-07-26 PROCEDURE — 99284 EMERGENCY DEPT VISIT MOD MDM: CPT | Mod: Z6 | Performed by: PSYCHIATRY & NEUROLOGY

## 2019-07-26 PROCEDURE — 90791 PSYCH DIAGNOSTIC EVALUATION: CPT

## 2019-07-26 PROCEDURE — 99285 EMERGENCY DEPT VISIT HI MDM: CPT | Mod: 25 | Performed by: PSYCHIATRY & NEUROLOGY

## 2019-07-26 SDOH — SOCIAL STABILITY - SOCIAL INSECURITY: PROBLEM RELATED TO PRIMARY SUPPORT GROUP, UNSPECIFIED: Z63.9

## 2019-07-26 ASSESSMENT — ENCOUNTER SYMPTOMS
HYPERACTIVE: 0
CONSTIPATION: 1
DECREASED CONCENTRATION: 1
MUSCULOSKELETAL NEGATIVE: 1
SLEEP DISTURBANCE: 1
CARDIOVASCULAR NEGATIVE: 1
NEUROLOGICAL NEGATIVE: 1
NERVOUS/ANXIOUS: 1
RESPIRATORY NEGATIVE: 1
APPETITE CHANGE: 1
DIARRHEA: 1
HALLUCINATIONS: 0

## 2019-07-27 PROBLEM — R45.851 SUICIDAL IDEATION: Status: ACTIVE | Noted: 2019-07-27

## 2019-07-27 LAB
AMPHETAMINES UR QL SCN: NEGATIVE
BARBITURATES UR QL: NEGATIVE
BENZODIAZ UR QL: NEGATIVE
CANNABINOIDS UR QL SCN: NEGATIVE
COCAINE UR QL: NEGATIVE
ETHANOL UR QL SCN: NEGATIVE
HCG UR QL: NEGATIVE
OPIATES UR QL SCN: NEGATIVE

## 2019-07-27 PROCEDURE — 80307 DRUG TEST PRSMV CHEM ANLYZR: CPT | Performed by: EMERGENCY MEDICINE

## 2019-07-27 PROCEDURE — 80320 DRUG SCREEN QUANTALCOHOLS: CPT | Performed by: EMERGENCY MEDICINE

## 2019-07-27 PROCEDURE — 81025 URINE PREGNANCY TEST: CPT | Performed by: EMERGENCY MEDICINE

## 2019-07-27 PROCEDURE — 25000132 ZZH RX MED GY IP 250 OP 250 PS 637: Performed by: STUDENT IN AN ORGANIZED HEALTH CARE EDUCATION/TRAINING PROGRAM

## 2019-07-27 PROCEDURE — 12400001 ZZH R&B MH UMMC

## 2019-07-27 RX ORDER — BISMUTH SUBSALICYLATE 262 MG/1
1 TABLET, CHEWABLE ORAL
Status: DISCONTINUED | OUTPATIENT
Start: 2019-07-27 | End: 2019-07-28

## 2019-07-27 RX ORDER — POLYETHYLENE GLYCOL 3350 17 G/17G
17 POWDER, FOR SOLUTION ORAL DAILY
Status: DISCONTINUED | OUTPATIENT
Start: 2019-07-27 | End: 2019-07-30 | Stop reason: HOSPADM

## 2019-07-27 RX ORDER — HYDROXYZINE HYDROCHLORIDE 25 MG/1
25 TABLET, FILM COATED ORAL EVERY 4 HOURS PRN
Status: DISCONTINUED | OUTPATIENT
Start: 2019-07-27 | End: 2019-07-30 | Stop reason: HOSPADM

## 2019-07-27 RX ORDER — BISACODYL 10 MG
10 SUPPOSITORY, RECTAL RECTAL DAILY PRN
Status: DISCONTINUED | OUTPATIENT
Start: 2019-07-27 | End: 2019-07-30 | Stop reason: HOSPADM

## 2019-07-27 RX ORDER — DIPHENHYDRAMINE HCL 25 MG
25-50 CAPSULE ORAL
Status: DISCONTINUED | OUTPATIENT
Start: 2019-07-27 | End: 2019-07-29

## 2019-07-27 RX ORDER — ACETAMINOPHEN 325 MG/1
650 TABLET ORAL EVERY 6 HOURS PRN
Status: DISCONTINUED | OUTPATIENT
Start: 2019-07-27 | End: 2019-07-30 | Stop reason: HOSPADM

## 2019-07-27 RX ORDER — ONDANSETRON 4 MG/1
4 TABLET, ORALLY DISINTEGRATING ORAL EVERY 8 HOURS PRN
Status: DISCONTINUED | OUTPATIENT
Start: 2019-07-27 | End: 2019-07-30 | Stop reason: HOSPADM

## 2019-07-27 RX ADMIN — BISMUTH SUBSALICYLATE 262 MG: 262 TABLET, CHEWABLE ORAL at 21:27

## 2019-07-27 RX ADMIN — POLYETHYLENE GLYCOL 3350 17 G: 17 POWDER, FOR SOLUTION ORAL at 19:16

## 2019-07-27 RX ADMIN — DIPHENHYDRAMINE HYDROCHLORIDE 50 MG: 25 CAPSULE ORAL at 20:52

## 2019-07-27 ASSESSMENT — ACTIVITIES OF DAILY LIVING (ADL)
HYGIENE/GROOMING: INDEPENDENT
ORAL_HYGIENE: INDEPENDENT
LAUNDRY: WITH SUPERVISION
DRESS: INDEPENDENT

## 2019-07-27 ASSESSMENT — MIFFLIN-ST. JEOR: SCORE: 1181.31
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
## 2019-07-27 NOTE — ED NOTES
Emergency Department Patient Sign-out       Brief HPI:  This is a 28 year old female signed out to me by Dr. Lizama .  See initial ED Provider note for details of the presentation.            Significant Events prior to my assuming care: Patient presented to the emergency department with suicidal ideations.  She underwent [facility] assessment and is felt that the patient will require inpatient treatment and management.      Exam:   Patient Vitals for the past 24 hrs:   BP Temp Temp src Pulse Resp SpO2 Weight   07/26/19 2328 -- -- -- -- -- -- 52.5 kg (115 lb 11.9 oz)   07/26/19 2212 144/44 97.4  F (36.3  C) Oral 78 16 100 % --           ED RESULTS:   No results found for this visit on 07/26/19 (from the past 24 hour(s)).    ED MEDICATIONS:   Medications - No data to display      Impression:    ICD-10-CM    1. Autism F84.0    2. Relationship problems Z63.9        Plan:    Admit to psychiatric services.      Nolan Kerr MD, MD  07/27/19 1756

## 2019-07-27 NOTE — ED NOTES
I have performed an in person assessment of the patient. Based on this assessment the patient no longer requires a one on one attendant at this point in time.    Wilmer Lizama,   10:35 PM  July 26, 2019         Wilmer Lizama,   07/26/19 2236
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
## 2019-07-27 NOTE — ED NOTES
Bed: ED16B  Expected date: 7/26/19  Expected time: 9:52 PM  Means of arrival:   Comments:  Neema 444 27y/o F suicidal ideation with constipation

## 2019-07-27 NOTE — ED NOTES
Observation Brochure and Video    Patient informed of observation status based on provider's order.  Observation brochure was given and the video watched. Patient/Family stated understanding. Questions answered.  Urszula Gil

## 2019-07-27 NOTE — PROGRESS NOTES
07/27/19 1153   Patient Belongings   Did you bring any home meds/supplements to the hospital?  No   Patient Belongings remains with patient;locker;sent to security per site process   Belongings Search Yes   Clothing Search Yes   Second Staff Emmanuelle Ramos sent to security (envelope #[id number]): Visa debit - 2561, Total Visa - [phone], Visa debit - 2880, Visa debit - 1413, [place] EBT - 0667    Belongings in locker: cell phone, [place] ID X 2, zippered wallet, magnetic key, single silver key, loose change, health insurance cards, [employer] [place] student ID, various membership cards, hair tie, red slip on shoes, socks, jeans, t-shirt    A               Admission:  I am responsible for any personal items that are not sent to the safe or pharmacy.  [facility] is not responsible for loss, theft or damage of any property in my possession.    Signature:  _________________________________ Date: _______  Time: _____                                              Staff Signature:  ____________________________ Date: ________  Time: _____      2nd Staff person, if patient is unable/unwilling to sign:    Signature: ________________________________ Date: ________  Time: _____     Discharge:  [facility] has returned all of my personal belongings:    Signature: _________________________________ Date: ________  Time: _____                                          Staff Signature:  ____________________________ Date: ________  Time: _____

## 2019-07-28 LAB
ALBUMIN SERPL-MCNC: 3 G/DL (ref 3.4–5)
ALP SERPL-CCNC: 55 U/L (ref 40–150)
ALT SERPL W P-5'-P-CCNC: 15 U/L (ref 0–50)
ANION GAP SERPL CALCULATED.3IONS-SCNC: 6 MMOL/L (ref 3–14)
AST SERPL W P-5'-P-CCNC: 10 U/L (ref 0–45)
BASOPHILS # BLD AUTO: 0 10E9/L (ref 0–0.2)
BASOPHILS NFR BLD AUTO: 0.3 %
BILIRUB SERPL-MCNC: 0.3 MG/DL (ref 0.2–1.3)
BUN SERPL-MCNC: 17 MG/DL (ref 7–30)
CALCIUM SERPL-MCNC: 8.4 MG/DL (ref 8.5–10.1)
CHLORIDE SERPL-SCNC: 104 MMOL/L (ref 94–109)
CHOLEST SERPL-MCNC: 113 MG/DL
CO2 SERPL-SCNC: 30 MMOL/L (ref 20–32)
CREAT SERPL-MCNC: 0.65 MG/DL (ref 0.52–1.04)
DIFFERENTIAL METHOD BLD: NORMAL
EOSINOPHIL # BLD AUTO: 0.2 10E9/L (ref 0–0.7)
EOSINOPHIL NFR BLD AUTO: 2.4 %
ERYTHROCYTE [DISTWIDTH] IN BLOOD BY AUTOMATED COUNT: 11.7 % (ref 10–15)
GFR SERPL CREATININE-BSD FRML MDRD: >90 ML/MIN/{1.73_M2}
GLUCOSE SERPL-MCNC: 74 MG/DL (ref 70–99)
HCT VFR BLD AUTO: 36.8 % (ref 35–47)
HDLC SERPL-MCNC: 44 MG/DL
HGB BLD-MCNC: 12.3 G/DL (ref 11.7–15.7)
IMM GRANULOCYTES # BLD: 0 10E9/L (ref 0–0.4)
IMM GRANULOCYTES NFR BLD: 0.1 %
LDLC SERPL CALC-MCNC: 52 MG/DL
LYMPHOCYTES # BLD AUTO: 3.1 10E9/L (ref 0.8–5.3)
LYMPHOCYTES NFR BLD AUTO: 40.2 %
MAGNESIUM SERPL-MCNC: 2.5 MG/DL (ref 1.6–2.3)
MCH RBC QN AUTO: 31.6 PG (ref 26.5–33)
MCHC RBC AUTO-ENTMCNC: 33.4 G/DL (ref 31.5–36.5)
MCV RBC AUTO: 95 FL (ref 78–100)
MONOCYTES # BLD AUTO: 0.7 10E9/L (ref 0–1.3)
MONOCYTES NFR BLD AUTO: 9.3 %
NEUTROPHILS # BLD AUTO: 3.7 10E9/L (ref 1.6–8.3)
NEUTROPHILS NFR BLD AUTO: 47.7 %
NONHDLC SERPL-MCNC: 69 MG/DL
NRBC # BLD AUTO: 0 10*3/UL
NRBC BLD AUTO-RTO: 0 /100
PHOSPHATE SERPL-MCNC: 3.8 MG/DL (ref 2.5–4.5)
PLATELET # BLD AUTO: 320 10E9/L (ref 150–450)
POTASSIUM SERPL-SCNC: 3.5 MMOL/L (ref 3.4–5.3)
PROT SERPL-MCNC: 6.7 G/DL (ref 6.8–8.8)
RBC # BLD AUTO: 3.89 10E12/L (ref 3.8–5.2)
SODIUM SERPL-SCNC: 140 MMOL/L (ref 133–144)
TRIGL SERPL-MCNC: 83 MG/DL
TSH SERPL DL<=0.005 MIU/L-ACNC: 1.88 MU/L (ref 0.4–4)
WBC # BLD AUTO: 7.8 10E9/L (ref 4–11)

## 2019-07-28 PROCEDURE — 25000132 ZZH RX MED GY IP 250 OP 250 PS 637: Performed by: STUDENT IN AN ORGANIZED HEALTH CARE EDUCATION/TRAINING PROGRAM

## 2019-07-28 PROCEDURE — 99207 ZZC CONSULT E&M CHANGED TO INITIAL LEVEL: CPT | Performed by: PHYSICIAN ASSISTANT

## 2019-07-28 PROCEDURE — 25000132 ZZH RX MED GY IP 250 OP 250 PS 637: Performed by: PHYSICIAN ASSISTANT

## 2019-07-28 PROCEDURE — 85025 COMPLETE CBC W/AUTO DIFF WBC: CPT | Performed by: STUDENT IN AN ORGANIZED HEALTH CARE EDUCATION/TRAINING PROGRAM

## 2019-07-28 PROCEDURE — 36415 COLL VENOUS BLD VENIPUNCTURE: CPT | Performed by: STUDENT IN AN ORGANIZED HEALTH CARE EDUCATION/TRAINING PROGRAM

## 2019-07-28 PROCEDURE — 80061 LIPID PANEL: CPT | Performed by: STUDENT IN AN ORGANIZED HEALTH CARE EDUCATION/TRAINING PROGRAM

## 2019-07-28 PROCEDURE — 83735 ASSAY OF MAGNESIUM: CPT | Performed by: STUDENT IN AN ORGANIZED HEALTH CARE EDUCATION/TRAINING PROGRAM

## 2019-07-28 PROCEDURE — 84443 ASSAY THYROID STIM HORMONE: CPT | Performed by: STUDENT IN AN ORGANIZED HEALTH CARE EDUCATION/TRAINING PROGRAM

## 2019-07-28 PROCEDURE — 12400001 ZZH R&B MH UMMC

## 2019-07-28 PROCEDURE — 84100 ASSAY OF PHOSPHORUS: CPT | Performed by: STUDENT IN AN ORGANIZED HEALTH CARE EDUCATION/TRAINING PROGRAM

## 2019-07-28 PROCEDURE — 99221 1ST HOSP IP/OBS SF/LOW 40: CPT | Performed by: PHYSICIAN ASSISTANT

## 2019-07-28 PROCEDURE — 80053 COMPREHEN METABOLIC PANEL: CPT | Performed by: STUDENT IN AN ORGANIZED HEALTH CARE EDUCATION/TRAINING PROGRAM

## 2019-07-28 RX ORDER — BISMUTH SUBSALICYLATE 262 MG/1
1 TABLET, CHEWABLE ORAL 4 TIMES DAILY PRN
Status: DISCONTINUED | OUTPATIENT
Start: 2019-07-28 | End: 2019-07-30 | Stop reason: HOSPADM

## 2019-07-28 RX ORDER — LANOLIN ALCOHOL/MO/W.PET/CERES
3 CREAM (GRAM) TOPICAL
Status: DISCONTINUED | OUTPATIENT
Start: 2019-07-28 | End: 2019-07-29

## 2019-07-28 RX ADMIN — BISMUTH SUBSALICYLATE 262 MG: 262 TABLET, CHEWABLE ORAL at 12:49

## 2019-07-28 RX ADMIN — METHYLCELLULOSE 500 MG: 500 TABLET ORAL at 20:00

## 2019-07-28 RX ADMIN — ACETAMINOPHEN 650 MG: 325 TABLET, FILM COATED ORAL at 11:25

## 2019-07-28 RX ADMIN — POLYETHYLENE GLYCOL 3350 17 G: 17 POWDER, FOR SOLUTION ORAL at 08:56

## 2019-07-28 RX ADMIN — MELATONIN TAB 3 MG 3 MG: 3 TAB at 22:08

## 2019-07-28 RX ADMIN — BISMUTH SUBSALICYLATE 262 MG: 262 TABLET, CHEWABLE ORAL at 08:56

## 2019-07-28 ASSESSMENT — MIFFLIN-ST. JEOR: SCORE: 1177.23

## 2019-07-28 NOTE — CONSULTS
"  Internal Medicine Initial Visit    Brianne Colunga MRN# [id number]   Age: 28 year old YOB: 1990   Date of Admission: 7/26/2019     Reason for consult: IBS, constipation        Requesting physician Dr. Ayana Delgadillo       SUBJECTIVE   HPI:   Brianne Colunga is a 28 year old female with history of IBS with chronic slow transit constipation and fecal incontinence, depression, anxiety, bipolar II, spectrum disorder, ADHD  admitted to station 20[facility] for worsening depression and SI. Medicine was consulted due to history of IBS and constipation. Patient reported abdominal pain in the ED and CT abdomen was obtained.CT abdomen in the ED revealed a very large amount of stool distending the rectum and sigmoid colon with moderate stool throughout the remaining colon. Patient reports chronic history of IBS with constipation. She takes peptobismal PRN for \"upset stomach\" due to lactose and Miralax PRN. She has been seen outpatient by GI in the past, she reports her last colonoscopy was 2016 but is unsure of the results. She notes fecal incontinence is also a chronic issues which typically occurs when she is becoming constipated. She was recently seen by Colorectal surgery 6/20/19 for IBS with constipation, diarrhea and fecal incontinence and started on Citrucel. She was referred to pelvic floor PT and GI whoever has not completed those recommendations.  Today, she reports she had a very large stool yesterday and feels constipation has resolved. She no longer has any abdominal pain or nausea. She reports there was a small amount of blood on her tissue after large bowel movement yesterday.     Denies fevers, chills, chest pain, SOB, weakness, numbness, tingling, rash.      Past Medical History:     Past Medical History:   Diagnosis Date     Acid reflux      Anxiety      Depressive disorder      Irritable bowel syndrome      Scoliosis       Reviewed & updated in [facility].     Past Surgical History:      Past Surgical " History:   Procedure Laterality Date     COLONOSCOPY        Reviewed & updated in Epic.     Medications prior to admission:     Prior to Admission Medications   Prescriptions Last Dose Informant Patient Reported? Taking?   bismuth subsalicylate (PEPTO BISMOL) 262 MG chewable tablet Unknown at Unknown time  Yes No   Sig: Take 1 tablet by mouth 4 times daily (before meals and nightly)   ondansetron (ZOFRAN ODT) 4 MG ODT tab Past Week at Unknown time  No Yes   Sig: Take 1 tablet (4 mg) by mouth every 8 hours as needed for nausea   polyethylene glycol (MIRALAX/GLYCOLAX) powder Unknown at Unknown time  No No   Sig: Take 17 g by mouth daily   traZODone (DESYREL) 50 MG tablet 7/26/2019 at 2000  No Yes   Sig: Take 0.5-2 tablets ( mg) by mouth nightly as needed for sleep      Facility-Administered Medications: None         Allergies:     Allergies   Allergen Reactions     Lactose      Other reaction(s): GI UPSET     Seasonal Allergies          Social History:     Social History     Socioeconomic History     Marital status: Single     Spouse name: Not on file     Number of children: Not on file     Years of education: Not on file     Highest education level: Not on file   Occupational History     Not on file   Social Needs     Financial resource strain: Not on file     Food insecurity:     Worry: Not on file     Inability: Not on file     Transportation needs:     Medical: Not on file     Non-medical: Not on file   Tobacco Use     Smoking status: Never Smoker     Smokeless tobacco: Never Used   Substance and Sexual Activity     Alcohol use: Never     Frequency: Never     Drug use: Never     Sexual activity: Not Currently     Partners: Male   Lifestyle     Physical activity:     Days per week: Not on file     Minutes per session: Not on file     Stress: Not on file   Relationships     Social connections:     Talks on phone: Not on file     Gets together: Not on file     Attends Orthodox service: Not on file     Active  "member of club or organization: Not on file     Attends meetings of clubs or organizations: Not on file     Relationship status: Not on file     Intimate partner violence:     Fear of current or ex partner: Not on file     Emotionally abused: Not on file     Physically abused: Not on file     Forced sexual activity: Not on file   Other Topics Concern     Not on file   Social History Narrative     Not on file        Family History:     Family History   Problem Relation Age of Onset     Irritable Bowel Syndrome Mother      Depression Mother      Anxiety Disorder Mother      Autism Spectrum Disorder Mother      Heart Disease Father      Prostate Cancer Paternal Grandfather      Autism Spectrum Disorder Sister         Low functioning Autism, lives in a group home     Macular Degeneration No family hx of      Glaucoma No family hx of         Reviewed & updated in Epic.     Review of Systems:   Ten point review of systems negative except as stated above in History of Present Illness.    OBJECTIVE   Physical Exam:   Blood pressure 112/50, pulse 73, temperature 97.3  F (36.3  C), temperature source Tympanic, resp. rate 14, height 1.626 m (5' 4\"), weight 45.7 kg (100 lb 12.8 oz), last menstrual period 07/17/2019, SpO2 99 %.  General: Alert, awake, and in NAD. Non-toxic appearing.  HEENT: NC/AT. Anicteric sclera. Eyes symmetric and free of discharge bilaterally. Mucous membranes moist.  Neck: Supple  Cardiovascular: RRR. S1,S2. No murmurs appreciated.  Lungs: Normal respiratory effort on RA. Lungs CTAB without wheezes, rales, or rhonchi.  Abdomen: Soft, non-tender, and nondistended with normoactive bowel sounds.  Extremities: Warm & well perfused. No peripheral edema.  Skin: No rashes, jaundice, or lesions on exposed areas of skin.  Neurologic: A&O X 3. Answers questions appropriately. Moves all extremities symmetrically.     Laboratory Data:   [facility]  Recent Labs   Lab 07/28/19  0749 07/24/19  0017    140   POTASSIUM 3.5 " 3.1*   CHLORIDE 104 103   CO2 30 31   ANIONGAP 6 6   GLC 74 124*   BUN 17 17   CR 0.65 0.66   GFRESTIMATED >90 >90   GFRESTBLACK >90 >90   BOB 8.4* 8.5   MAG 2.5*  --    PHOS 3.8  --    PROTTOTAL 6.7* 7.0   ALBUMIN 3.0* 3.2*   BILITOTAL 0.3 0.3   ALKPHOS 55 65   AST 10 10   ALT 15 17       CBC  Recent Labs   Lab 07/28/19  0749 07/24/19  0017   WBC 7.8 14.5*   RBC 3.89 3.74*   HGB 12.3 12.0   HCT 36.8 34.7*   MCV 95 93   MCH 31.6 32.1   MCHC 33.4 34.6   RDW 11.7 11.8    271       TSH  Recent Labs   Lab 07/28/19  0749   TSH 1.88          Assessment and Plan/Recommendations:   Brianne Colunga is a 28 year old female with history of IBS with chronic slow transit constipation and fecal incontinence, depression, anxiety, bipolar II, spectrum disorder, ADHD  admitted to station 20 for worsening depression and SI.    Depression  Anxiety  Bipolar II  Spectrum disorder  ADHD   - Management per psychiatry     IBS with chronic constipation and diarrhea   Fecal incontinence   CT abdomen 7/24/19 with very large amount of stool distending the rectum and sigmoid colon with moderate stool throughout the remaining colon. There was mild nonspecific rectal wall thickening which may represent inflammation vs reactive changes. History of IBS with chronic constipation and fecal incontinence dating back to 2015 in chart review. She has been seen by GI and most recently Colorectal surgery in the past. She is recommended to be on fiber supplement TID (unknown compliance) and completed pelvic floor PT. She has previously been recommended for anorectal manometry evaluation which she declined. Patient reports large stool yesterday and no longer has abdominal pain or nausea.   - Check post void residual, notify medicine if > 300 ml   - Continue miralax daily X at least 3 days to ensure prevention on constipation   - Resume Citrucel BID with daily PRN dose   - Continue pepto-bismal PRN for nausea   - Follow up with GI as outpatient for  ongoing management of chronic constipation   - Follow up with referral for pelvic floor PT for biofeedback training as she had improvement in symptoms with this in the past   - Per colorectal consult, can return to colorectal clinic in one months if ongoing symptoms of fecal incontinence     Currently, patient is medically stable and medicine will sign off. Please page the Internal Medicine job code pager for any intercurrent medical issues which arise. Thank you for the opportunity to be a part of this patient's care.    Paloma Stern PA-C  Hospitalist Service  [phone]

## 2019-07-28 NOTE — PROGRESS NOTES
Pt was out in the milieu and watched couple of moves with peers. Pt was calm, and did not have any bathroom accidents all shifts. Please refer to nurses note and flow sheet. Nurse did not want more than one chart on patient.
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
## 2019-07-28 NOTE — PROGRESS NOTES
"Pt was out in the milieu in the beginning of the shift. She has complaints of constipation and was given Miralex  she declined the suppository because she said her bottom was sore. She has been perseverating about her bowl movements and spent much of the evening in her bathroom .Writer attempted to check in several times but she just said \"I am constipated\". She has needed redirection from staff for soiling her pants and sitting on the chairs in the lounge. She reported writer that she has been having \"encopresis\" since she was a \"little girl\". Pt is aware that she is soiling her pants and appears to be doing this to get her needs met from staff.   "

## 2019-07-28 NOTE — PROGRESS NOTES
Pt requesting tylenol for headache at 11 am with reports of helping at noon. Pt needs direction, simple instruction and assist with initiation of ADL's due to new environment at this time; will have staff provide more orientation of unit; Overall, pt did not engage in disrupting behaviors this shift and appearance and hygiene WNL.

## 2019-07-28 NOTE — PHARMACY-ADMISSION MEDICATION HISTORY
"Admission Medication History status for the 7/26/2019 admission is complete.  See EPIC admission navigator for Prior to Admission medications.    Medication history sources:  Patient, [facility]     Medication history source reliability: Good. Patient able to answer questions, verified prescription with [facility]    Medication adherence:  Good. Patient says she regularly takes 50 mg trazodone at night.     Changes made to PTA medication list (reason)  Added: none  Deleted:     Bismuth subsalicylate 262 mg chew tablet: take 1 tablet by mouth 4 times daily   -patient says she does not take at home     Ondansetron 4 mg ODT tablet: take 1 tablet by mouth every 8 hours as needed for nausea   -Patient says she does not take at home    Polyethylene glycol powder: take 17 g by mouth daily   -Patient says she does not take at home  Changed: none    Additional medication history information (including reliability of information, actions taken by pharmacist):     This patient's home medications and drug allergies were reviewed. Patient says the only medication she takes is \"50 mg trazodone at night.\" This medication was filled at [facility] and able to be confirmed via [facility]. The patient denies any other medication use.  No other changes were made to the medication list at this time.     Time spent in this activity: 20 minutes    Medication history completed by: Oly Ingram [facility] pharmacy intern    Prior to Admission medications    Medication Sig Last Dose Taking? Auth Provider   traZODone (DESYREL) 50 MG tablet Take 0.5-2 tablets ( mg) by mouth nightly as needed for sleep 7/26/2019 at 2000 Yes Johnny Patrick MD       "
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
## 2019-07-29 ENCOUNTER — TELEPHONE (OUTPATIENT)
Dept: FAMILY MEDICINE | Facility: CLINIC | Age: 29
End: 2019-07-29

## 2019-07-29 PROCEDURE — 12400001 ZZH R&B MH UMMC

## 2019-07-29 PROCEDURE — 25000132 ZZH RX MED GY IP 250 OP 250 PS 637: Performed by: STUDENT IN AN ORGANIZED HEALTH CARE EDUCATION/TRAINING PROGRAM

## 2019-07-29 PROCEDURE — 99232 SBSQ HOSP IP/OBS MODERATE 35: CPT | Mod: GC | Performed by: PSYCHIATRY & NEUROLOGY

## 2019-07-29 PROCEDURE — 25000132 ZZH RX MED GY IP 250 OP 250 PS 637: Performed by: PHYSICIAN ASSISTANT

## 2019-07-29 RX ORDER — LANOLIN ALCOHOL/MO/W.PET/CERES
6 CREAM (GRAM) TOPICAL
Status: DISCONTINUED | OUTPATIENT
Start: 2019-07-29 | End: 2019-07-30 | Stop reason: HOSPADM

## 2019-07-29 RX ADMIN — BISMUTH SUBSALICYLATE 262 MG: 262 TABLET, CHEWABLE ORAL at 19:55

## 2019-07-29 RX ADMIN — METHYLCELLULOSE 500 MG: 500 TABLET ORAL at 09:37

## 2019-07-29 RX ADMIN — MELATONIN TAB 3 MG 6 MG: 3 TAB at 20:58

## 2019-07-29 RX ADMIN — METHYLCELLULOSE 500 MG: 500 TABLET ORAL at 20:41

## 2019-07-29 ASSESSMENT — ACTIVITIES OF DAILY LIVING (ADL)
HYGIENE/GROOMING: INDEPENDENT
DRESS: INDEPENDENT
ORAL_HYGIENE: INDEPENDENT

## 2019-07-29 NOTE — PLAN OF CARE
BEHAVIORAL TEAM DISCUSSION    Participants:   Dr. Vivas, Dr. Jeffery, Dr. Russo, Luli Douglas MA.LP, Sofya Gil RN, Pharmacy    Continued Stay Criteria/Rationale:   Depression with S, Insomnia    Medical/Physical:   IBS    Precautions:   Behavioral Orders   Procedures     Code 1 - Restrict to Unit     Routine Programming     As clinically indicated     Single Room     Status 15     Every 15 minutes.     Status Individual Observation     For close observation of patient to redirect her from smearing feces on the furniture etc.     Order Specific Question:   CONTINUOUS 24 hours / day     Answer:   5 feet     Order Specific Question:   Indications for SIO     Answer:   Severe intrusiveness     Suicide precautions     Patients on Suicide Precautions should have a Combination Diet ordered that includes a Diet selection(s) AND a Behavioral Tray selection for Safe Tray - with utensils, or Safe Tray - NO utensils       Plan:   Patient will have ongoing psychiatric assessment.  Medications will be reviewed and adjusted per MD as indicated.  Outpatient providers will be contacted for care coordination.  Hospital staff will provide a safe environment and a therapeutic milieu. Patient will be encouraged to participate in unit groups and activities.   CTC will continue to assess needs and  ensure appropriate follow up care is in place.         Rationale for change in precautions or plan:   No change in plan/precautions

## 2019-07-29 NOTE — PROGRESS NOTES
07/29/19 1427   Behavioral Health   Hallucinations denies / not responding to hallucinations   Thinking poor concentration;distractable;confused   Orientation situation, disoriented   Memory confabulation   Insight poor   Judgement impaired   Eye Contact staring;out of corner of eyes;at floor   Affect incongruent   Mood other (see comments)  (friendly, but does not seem to be aware of mood or situation)   Physical Appearance/Attire disheveled;other (see comment)  (soiling herself)   Hygiene neglected grooming - unclean body, hair, teeth   Suicidality other (see comments)  (denies)   1. Wish to be Dead No   2. Non-Specific Active Suicidal Thoughts  No   Self Injury other (see comment)  (denies)   Elopement   (nothing to report)   Activity isolative;withdrawn   Speech pressured   Medication Sensitivity no observed side effects   Psychomotor / Gait balanced;steady   Psycho Education   Type of Intervention 1:1 intervention   Response participates, initiates socially appropriate   Hours 0.5   Treatment Detail   (Check in)   Activities of Daily Living   Hygiene/Grooming independent   Oral Hygiene independent   Dress independent   Room Organization independent   The patient was out of her room and this shift, but was not social with peers.  The patient was incongruent in her affect and often observed laughing to herself or as responses to simple questions.  The patient is having trouble with soiling herself in the common areas and then resistant to cleaning herself.  The patient was spent most of the day coloring in the dinning area and would remain hyper focused with no response to events around her.  The patient was showing off her colorings in a way similar to how a 5 year old would.  The patient would sometimes shift out of these behaviors and become more focused and aware.  The instance the patient was engaged by her nurse for an extended period of time, during which she was able to respond more appropriately and  even retrieve phone number from her cell phone and check to see if her paycheck was deposited.  When left without engagement she would revert back to her prior behaviors.  The patient denies SI and SiB.

## 2019-07-29 NOTE — TELEPHONE ENCOUNTER
Swetha is calling because she wanted to let the provider know that the patient completed a mental health assessment. It has been faxed over to the provider and is just an FYI.

## 2019-07-29 NOTE — PROGRESS NOTES
Pt had a good shift. She denied depression with  She did not have any incontinence. She did not have any complaints of constipation and did not have any episodes of incontinence. She was pleasant and cooperative with staff. She remained occupied by coloring and socializing with staff.

## 2019-07-29 NOTE — PROGRESS NOTES
Brief medicine note:  - Discontinued bladder scan ordered. Please encourage pt to void frequently throughout the day. Continue with current bowel regimen as ordered. Discontinue prn diphenhydramine to avoid exacerbating her chronic constipation. Please re-consult medicine for any new medical concerns if they arise.        Nathan Hackett PA-C  Internal Medicine Hospitalist   [facility]  [phone]

## 2019-07-29 NOTE — PLAN OF CARE
Problem: Adult Inpatient Plan of Care  Goal: Patient-Specific Goal (Individualization)  Note:   Patient's goals:  Go Home    Plan for admission:  1. Stabilization of mood disorder symptoms  2. Absence of SI- safe with self  3. Medication management per MD's  4. Coordination of care with outpatient providers, family  5. Psychiatric follow up care in place
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
## 2019-07-30 VITALS
OXYGEN SATURATION: 97 % | HEART RATE: 77 BPM | TEMPERATURE: 97.9 F | DIASTOLIC BLOOD PRESSURE: 67 MMHG | WEIGHT: 108 LBS | SYSTOLIC BLOOD PRESSURE: 108 MMHG | RESPIRATION RATE: 16 BRPM | BODY MASS INDEX: 18.44 KG/M2 | HEIGHT: 64 IN

## 2019-07-30 PROCEDURE — 25000132 ZZH RX MED GY IP 250 OP 250 PS 637: Performed by: STUDENT IN AN ORGANIZED HEALTH CARE EDUCATION/TRAINING PROGRAM

## 2019-07-30 PROCEDURE — 99238 HOSP IP/OBS DSCHRG MGMT 30/<: CPT | Mod: GC | Performed by: PSYCHIATRY & NEUROLOGY

## 2019-07-30 RX ADMIN — HYDROXYZINE HYDROCHLORIDE 25 MG: 25 TABLET ORAL at 03:16

## 2019-07-30 ASSESSMENT — ACTIVITIES OF DAILY LIVING (ADL)
HYGIENE/GROOMING: INDEPENDENT
DRESS: INDEPENDENT
ORAL_HYGIENE: INDEPENDENT

## 2019-07-30 ASSESSMENT — MIFFLIN-ST. JEOR: SCORE: 1209.88

## 2019-07-30 NOTE — PROGRESS NOTES
Pt is requesting discharge and is being discharged at this time. Pt is returning home. Pt has been calm, cooperative, and pleasant this shift. Pt denies depression and anxiety as well as SI/SIB. Pt denies all other mental health symptoms including hallucinations and has not been observed exhibiting such symptoms. Pt is aware of and in agreement with therapeutic plan in place. Pt expressed hopefulness to getting back to school and things she enjoys doing. Pt has expressed some coping skill to help with future behavioral issues. This writer went through after visit summary with patient and she expressed understanding.

## 2019-07-30 NOTE — PROGRESS NOTES
Patient is being discharged home today per Dr. Vivas.  Patient reports feeling fine and denies any thoughts of self harm.  She has been social/engaging on the unit.  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  Patient expressed concerns re: her roommate but states that they are both moving out in 2 weeks.  Dr. Jeffery attempted to call the Apartment staff however was not able to talk to anyone.  Both MD and myself have left ms' for patient's CADI CM and neither of us have received call back.  Patient has f/u appts in place with Psychiatry, therapy and neurology.  Patient will be sent home via cab.

## 2019-07-30 NOTE — DISCHARGE INSTRUCTIONS
Behavioral Discharge Planning and Instructions    Summary:   You were admitted to [facility] on 7/26/9  with worsening depression, agitation and suicidal ideation under the care of Dr. Vivas.  You met with Dr. Vivas and his team daily for ongoing psychiatric assessment and medication management.  You had opportunities to participate in therapeutic groups on the unit.   At this time you report your mood is stabilizing and you report you are not having thoughts or intent to harm yourself or others. You will be discharged home and will resume care with your outpatient providers.    Disposition:  Home    Main Diagnosis:   Major Depressive Disorder  ADHD      Major Treatments, Procedures and Findings:   Medications were  managed throughout your stay. An internal medicine consult was completed during your stay. You had the opportunity to participate in treatment programming while on the unit including occupational therapy, mental health support and education and spiritual services.     Symptoms to Report:   Please report if you are experiencing increased aggression and/or confusion, problematic loss of sleep, worsening mood, or thoughts of suicide to your treatment team or notify your primary provider.   IF THE SYMPTOMS YOU ARE EXPERIENCING ARE A MEDICAL EMERGENCY, CALL 911 IMMEDIATELY    Lifestyle Adjustment:   1. Adjust your lifestyle to get enough sleep, relaxation, exercise and good nutrition.  Continue to develop healthy coping skills to decrease stress and promote a healthy lifestyle.  2. Abstain from all substances of abuse.  3. Take medications as prescribed.  Please work with your doctor to discuss any concerns you have with your medications or side effects you may be experiencing.  4. Follow up with appointments as scheduled.        Psychiatry Follow-up:     Appointment: Neurology:  Dr. Ley: 8/1/19 at 2:00pm  [address] [facility]  [id number] [phone]    Appointment: Therapy: Garima Shannon  "TOI, 8/15/19 at 9:30am  [facility]: [address] [place] [address]  [phone]      Appointment: Psychiatry:  Dr. Elías Patrick: 8/27/19 at 1:45pm  [facility] [address] [facility] [address] [facility] [address]  [phone]    [facility] : [place]: [phone]    Resources:   *[facility]: COPE: ([phone]) 24 hour mobile crisis support for people having a mental health crisis in [facility].   *[facility] ([phone]). 24-hour walk-in crisis psychiatric support at [facility]; Emergency [facility] available 7:30am - 2:00pm  *[id number]: Text  \"life\"  to [phone]   A trained crisis counselor will respond immediately  *[facility]: ([phone])  24-hour confidential telephone counseling   *[facility] Emergency Room: [phone]    General Medication Instructions:   See your medication sheet(s) for instructions.   Take all medicines as directed.  Make no changes unless your doctor suggests them.   Go to all your doctor visits.  Be sure to have all your required lab tests. This way, your medicines can be refilled on time.  Do not use any drugs not prescribed by your doctor.    The treatment team has appreciated the opportunity to work with you.  We wish you the best in the future.    If you have any questions or concerns our unit number is [phone].     "

## 2019-07-30 NOTE — PROGRESS NOTES
"Pt is SIO.  Awake intermittently throughout the shift.  Communicated frustration with not being able to sleep, and her perception that she is \"not being helped enough\" during this hospitalization.  Expressed concern with missing classes.  Pt offered support and encouraged to talk to her doctor about her concerns with respects to missing classes.  Later in the shift, after using the bathroom, pt was placing her hands inside the back of her pants.  When asked to keep her hands where staff can see them, pt became irritable, tearful, and upset, telling this writer to \"shut up!\"  Pt also called this writer a \"bitch\" and stated \"stop staring at me, I don't want to be watched.\"  Pt was able to return to sleep shortly thereafter.    "

## 2019-07-30 NOTE — PLAN OF CARE
48 hours reassessment:  Pt's mood was calm and had full range of affect. She was out in the dining room doing puzzles. She was social with staffs and others. She denied having suicidal ideation or self-harm thoughts. She denied having auditory or visual hallucination. Overall, she had a good shift. She had good appetite.
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
## 2019-08-07 ASSESSMENT — ENCOUNTER SYMPTOMS
APPETITE CHANGE: 1
DIFFICULTY URINATING: 0
WEAKNESS: 0
FEVER: 0
CHEST TIGHTNESS: 0
ABDOMINAL DISTENTION: 0
RHINORRHEA: 0
LIGHT-HEADEDNESS: 0
BRUISES/BLEEDS EASILY: 0
FATIGUE: 0
NECK PAIN: 0
BACK PAIN: 0
DIZZINESS: 0
DYSURIA: 0
COUGH: 0
NECK STIFFNESS: 0
HEADACHES: 0
FLANK PAIN: 0
CONFUSION: 0
PALPITATIONS: 0
DIAPHORESIS: 0
UNEXPECTED WEIGHT CHANGE: 0
MYALGIAS: 0
SHORTNESS OF BREATH: 0
ABDOMINAL PAIN: 1
SORE THROAT: 0
ARTHRALGIAS: 0
CONSTIPATION: 1
CHILLS: 0

## 2019-08-14 ENCOUNTER — OFFICE VISIT (OUTPATIENT)
Dept: FAMILY MEDICINE | Facility: CLINIC | Age: 29
End: 2019-08-14
Payer: COMMERCIAL

## 2019-08-14 VITALS
HEART RATE: 70 BPM | HEIGHT: 64 IN | DIASTOLIC BLOOD PRESSURE: 64 MMHG | TEMPERATURE: 98.3 F | BODY MASS INDEX: 19.38 KG/M2 | WEIGHT: 113.5 LBS | SYSTOLIC BLOOD PRESSURE: 100 MMHG | OXYGEN SATURATION: 97 %

## 2019-08-14 DIAGNOSIS — F41.1 GENERALIZED ANXIETY DISORDER: ICD-10-CM

## 2019-08-14 DIAGNOSIS — K59.01 SLOW TRANSIT CONSTIPATION: ICD-10-CM

## 2019-08-14 DIAGNOSIS — F33.1 MODERATE EPISODE OF RECURRENT MAJOR DEPRESSIVE DISORDER (H): ICD-10-CM

## 2019-08-14 DIAGNOSIS — Z00.00 VISIT FOR PREVENTIVE HEALTH EXAMINATION: Primary | ICD-10-CM

## 2019-08-14 RX ORDER — POLYETHYLENE GLYCOL 3350 17 G/17G
17 POWDER, FOR SOLUTION ORAL
Status: ON HOLD | COMMUNITY
Start: 2018-06-25 | End: 2019-11-22

## 2019-08-14 RX ORDER — LANOLIN ALCOHOL/MO/W.PET/CERES
3 CREAM (GRAM) TOPICAL
COMMUNITY
Start: 2018-09-24

## 2019-08-14 RX ORDER — TRAZODONE HYDROCHLORIDE 50 MG/1
TABLET, FILM COATED ORAL
Status: ON HOLD | COMMUNITY
Start: 2019-07-16 | End: 2019-11-22

## 2019-08-14 ASSESSMENT — ANXIETY QUESTIONNAIRES
2. NOT BEING ABLE TO STOP OR CONTROL WORRYING: NEARLY EVERY DAY
1. FEELING NERVOUS, ANXIOUS, OR ON EDGE: NEARLY EVERY DAY
5. BEING SO RESTLESS THAT IT IS HARD TO SIT STILL: NEARLY EVERY DAY
6. BECOMING EASILY ANNOYED OR IRRITABLE: NEARLY EVERY DAY
7. FEELING AFRAID AS IF SOMETHING AWFUL MIGHT HAPPEN: NEARLY EVERY DAY
3. WORRYING TOO MUCH ABOUT DIFFERENT THINGS: MORE THAN HALF THE DAYS
GAD7 TOTAL SCORE: 20
7. FEELING AFRAID AS IF SOMETHING AWFUL MIGHT HAPPEN: NEARLY EVERY DAY
4. TROUBLE RELAXING: NEARLY EVERY DAY
GAD7 TOTAL SCORE: 20

## 2019-08-14 ASSESSMENT — PAIN SCALES - GENERAL: PAINLEVEL: NO PAIN (0)

## 2019-08-14 ASSESSMENT — MIFFLIN-ST. JEOR: SCORE: 1234.83
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
## 2019-08-14 NOTE — NURSING NOTE
"28 year old  Chief Complaint   Patient presents with     Physical     Pt is here for a physical.        Blood pressure 100/64, pulse 70, temperature 98.3  F (36.8  C), temperature source Oral, height 1.626 m (5' 4\"), weight 51.5 kg (113 lb 8 oz), last menstrual period 07/17/2019, SpO2 97 %. Body mass index is 19.48 kg/m .  BP completed using cuff size:      Vesna Kothari MA  August 14, 2019 8:21 AM  "

## 2019-08-14 NOTE — PATIENT INSTRUCTIONS
First, try a probiotic daily to help with constipation. You can ask the pharmacist about this when you refill your medications next time. Return annually for your exam. Thank you.   [facility] Medication Refill Request Information:  * Please contact your pharmacy regarding ANY request for medication refills.  ** [facility] Prescription Fax = [phone]  * Please allow 3 business days for routine medication refills.  * Please allow 5 business days for controlled substance medication refills.     [facility] Test Result notification information:  *You will be notified with in 7-10 days of your appointment day regarding the results of your test.  If you are on MyChart you will be notified as soon as the provider has reviewed the results and signed off on them.    [facility]: [phone]

## 2019-08-15 ASSESSMENT — ANXIETY QUESTIONNAIRES: GAD7 TOTAL SCORE: 20

## 2019-08-16 ENCOUNTER — TELEPHONE (OUTPATIENT)
Dept: FAMILY MEDICINE | Facility: CLINIC | Age: 29
End: 2019-08-16

## 2019-08-16 NOTE — TELEPHONE ENCOUNTER
Health Call Center    Phone Message    May a detailed message be left on voicemail: yes    Reason for Call: Other: Jany states they received an incomplete fax with missing pages for the PT's physical and med list.  She is requesting to have the clinic fax it again to [phone].  Jany is also sending over a new form if needed.  Please follow up with any questions at [phone]     Action Taken: Message routed to:  [facility] ([facility]

## 2019-09-11 ENCOUNTER — MEDICAL CORRESPONDENCE (OUTPATIENT)
Dept: HEALTH INFORMATION MANAGEMENT | Facility: CLINIC | Age: 29
End: 2019-09-11

## 2019-10-24 NOTE — TELEPHONE ENCOUNTER
RECORDS RECEIVED FROM: Internal    DATE RECEIVED: 2/5/20   NOTES STATUS DETAILS   OFFICE NOTE from referring provider Internal Referral from [facility]    [facility]    DISCHARGE SUMMARY from hospital Internal ED note 7/6/19   OPERATIVE REPORT N/A    MEDICATION LIST N/A         ENDOSCOPY  N/A    COLONOSCOPY N/A    ERCP N/A    EUS N/A    STOOL TESTING N/A    PERTINENT LABS Internal    PATHOLOGY REPORTS (RELATED) N/A    IMAGING (CT, MRI, EGD) N/A      REFERRAL INFORMATION    Date referral was placed: 2/5/20   Date all records received: N/A   Date records were scanned into Epic: N/A   Date records were sent to Provider to review: N/A   Date and recommendation received from provider:  LETTER SENT  SCHEDULE APPOINTMENT   Date patient was contacted to schedule: 10/24/19

## 2019-11-09 ENCOUNTER — TRANSFERRED RECORDS (OUTPATIENT)
Dept: HEALTH INFORMATION MANAGEMENT | Facility: CLINIC | Age: 29
End: 2019-11-09

## 2019-11-14 ENCOUNTER — APPOINTMENT (OUTPATIENT)
Dept: GENERAL RADIOLOGY | Facility: CLINIC | Age: 29
DRG: 389 | End: 2019-11-14
Attending: EMERGENCY MEDICINE
Payer: COMMERCIAL

## 2019-11-14 ENCOUNTER — HOSPITAL ENCOUNTER (INPATIENT)
Facility: CLINIC | Age: 29
LOS: 4 days | Discharge: HOME | DRG: 389 | End: 2019-11-22
Attending: EMERGENCY MEDICINE | Admitting: EMERGENCY MEDICINE
Payer: COMMERCIAL

## 2019-11-14 DIAGNOSIS — F41.1 GENERALIZED ANXIETY DISORDER: ICD-10-CM

## 2019-11-14 DIAGNOSIS — K59.01 SLOW TRANSIT CONSTIPATION: ICD-10-CM

## 2019-11-14 DIAGNOSIS — R33.9 URINARY RETENTION: ICD-10-CM

## 2019-11-14 DIAGNOSIS — F84.0 AUTISM SPECTRUM DISORDER: ICD-10-CM

## 2019-11-14 DIAGNOSIS — K21.9 GASTROESOPHAGEAL REFLUX DISEASE WITHOUT ESOPHAGITIS: ICD-10-CM

## 2019-11-14 DIAGNOSIS — K59.00 OBSTIPATION: Primary | ICD-10-CM

## 2019-11-14 LAB
ALBUMIN UR-MCNC: 10 MG/DL
ANION GAP SERPL CALCULATED.3IONS-SCNC: 5 MMOL/L (ref 3–14)
APPEARANCE UR: CLEAR
BASOPHILS # BLD AUTO: 0 10E9/L (ref 0–0.2)
BASOPHILS NFR BLD AUTO: 0.3 %
BILIRUB UR QL STRIP: NEGATIVE
BUN SERPL-MCNC: 17 MG/DL (ref 7–30)
CALCIUM SERPL-MCNC: 9.5 MG/DL (ref 8.5–10.1)
CHLORIDE SERPL-SCNC: 100 MMOL/L (ref 94–109)
CO2 SERPL-SCNC: 32 MMOL/L (ref 20–32)
COLOR UR AUTO: YELLOW
CREAT SERPL-MCNC: 0.76 MG/DL (ref 0.52–1.04)
DIFFERENTIAL METHOD BLD: ABNORMAL
EOSINOPHIL # BLD AUTO: 0.1 10E9/L (ref 0–0.7)
EOSINOPHIL NFR BLD AUTO: 0.7 %
ERYTHROCYTE [DISTWIDTH] IN BLOOD BY AUTOMATED COUNT: 11.2 % (ref 10–15)
GFR SERPL CREATININE-BSD FRML MDRD: >90 ML/MIN/{1.73_M2}
GLUCOSE SERPL-MCNC: 146 MG/DL (ref 70–99)
GLUCOSE UR STRIP-MCNC: NEGATIVE MG/DL
HCG SERPL QL: NEGATIVE
HCT VFR BLD AUTO: 39.7 % (ref 35–47)
HGB BLD-MCNC: 13.6 G/DL (ref 11.7–15.7)
HGB UR QL STRIP: ABNORMAL
IMM GRANULOCYTES # BLD: 0.1 10E9/L (ref 0–0.4)
IMM GRANULOCYTES NFR BLD: 0.4 %
KETONES UR STRIP-MCNC: 10 MG/DL
LEUKOCYTE ESTERASE UR QL STRIP: NEGATIVE
LYMPHOCYTES # BLD AUTO: 2.7 10E9/L (ref 0.8–5.3)
LYMPHOCYTES NFR BLD AUTO: 20.4 %
MAGNESIUM SERPL-MCNC: 2.7 MG/DL (ref 1.6–2.3)
MCH RBC QN AUTO: 31.9 PG (ref 26.5–33)
MCHC RBC AUTO-ENTMCNC: 34.3 G/DL (ref 31.5–36.5)
MCV RBC AUTO: 93 FL (ref 78–100)
MONOCYTES # BLD AUTO: 1 10E9/L (ref 0–1.3)
MONOCYTES NFR BLD AUTO: 7.5 %
MUCOUS THREADS #/AREA URNS LPF: PRESENT /LPF
NEUTROPHILS # BLD AUTO: 9.4 10E9/L (ref 1.6–8.3)
NEUTROPHILS NFR BLD AUTO: 70.7 %
NITRATE UR QL: NEGATIVE
NRBC # BLD AUTO: 0 10*3/UL
NRBC BLD AUTO-RTO: 0 /100
PH UR STRIP: 6 PH (ref 5–7)
PLATELET # BLD AUTO: 259 10E9/L (ref 150–450)
POTASSIUM SERPL-SCNC: 3.1 MMOL/L (ref 3.4–5.3)
RBC # BLD AUTO: 4.26 10E12/L (ref 3.8–5.2)
RBC #/AREA URNS AUTO: 15 /HPF (ref 0–2)
SODIUM SERPL-SCNC: 137 MMOL/L (ref 133–144)
SOURCE: ABNORMAL
SP GR UR STRIP: 1.03 (ref 1–1.03)
SQUAMOUS #/AREA URNS AUTO: <1 /HPF (ref 0–1)
UROBILINOGEN UR STRIP-MCNC: 4 MG/DL (ref 0–2)
WBC # BLD AUTO: 13.4 10E9/L (ref 4–11)
WBC #/AREA URNS AUTO: 1 /HPF (ref 0–5)

## 2019-11-14 PROCEDURE — G0378 HOSPITAL OBSERVATION PER HR: HCPCS

## 2019-11-14 PROCEDURE — 81001 URINALYSIS AUTO W/SCOPE: CPT | Performed by: EMERGENCY MEDICINE

## 2019-11-14 PROCEDURE — 83735 ASSAY OF MAGNESIUM: CPT | Performed by: PHYSICIAN ASSISTANT

## 2019-11-14 PROCEDURE — 51798 US URINE CAPACITY MEASURE: CPT

## 2019-11-14 PROCEDURE — 96374 THER/PROPH/DIAG INJ IV PUSH: CPT

## 2019-11-14 PROCEDURE — 84703 CHORIONIC GONADOTROPIN ASSAY: CPT | Performed by: EMERGENCY MEDICINE

## 2019-11-14 PROCEDURE — 85025 COMPLETE CBC W/AUTO DIFF WBC: CPT | Performed by: EMERGENCY MEDICINE

## 2019-11-14 PROCEDURE — 80048 BASIC METABOLIC PNL TOTAL CA: CPT | Performed by: EMERGENCY MEDICINE

## 2019-11-14 PROCEDURE — 25000128 H RX IP 250 OP 636: Performed by: EMERGENCY MEDICINE

## 2019-11-14 PROCEDURE — 83735 ASSAY OF MAGNESIUM: CPT | Performed by: EMERGENCY MEDICINE

## 2019-11-14 PROCEDURE — 99285 EMERGENCY DEPT VISIT HI MDM: CPT | Mod: Z6 | Performed by: EMERGENCY MEDICINE

## 2019-11-14 PROCEDURE — 25000132 ZZH RX MED GY IP 250 OP 250 PS 637: Performed by: EMERGENCY MEDICINE

## 2019-11-14 PROCEDURE — 25000128 H RX IP 250 OP 636: Performed by: PHYSICIAN ASSISTANT

## 2019-11-14 PROCEDURE — 25800030 ZZH RX IP 258 OP 636: Performed by: EMERGENCY MEDICINE

## 2019-11-14 PROCEDURE — 99285 EMERGENCY DEPT VISIT HI MDM: CPT | Mod: 25

## 2019-11-14 PROCEDURE — 96361 HYDRATE IV INFUSION ADD-ON: CPT

## 2019-11-14 PROCEDURE — 96376 TX/PRO/DX INJ SAME DRUG ADON: CPT

## 2019-11-14 PROCEDURE — 40000141 ZZH STATISTIC PERIPHERAL IV START W/O US GUIDANCE

## 2019-11-14 PROCEDURE — 74019 RADEX ABDOMEN 2 VIEWS: CPT

## 2019-11-14 RX ORDER — POTASSIUM CHLORIDE 1.5 G/1.58G
20-40 POWDER, FOR SOLUTION ORAL
Status: DISCONTINUED | OUTPATIENT
Start: 2019-11-14 | End: 2019-11-22 | Stop reason: HOSPADM

## 2019-11-14 RX ORDER — LORAZEPAM 2 MG/ML
0.5 INJECTION INTRAMUSCULAR ONCE
Status: DISCONTINUED | OUTPATIENT
Start: 2019-11-14 | End: 2019-11-14

## 2019-11-14 RX ORDER — POTASSIUM CL/LIDO/0.9 % NACL 10MEQ/0.1L
10 INTRAVENOUS SOLUTION, PIGGYBACK (ML) INTRAVENOUS
Status: DISCONTINUED | OUTPATIENT
Start: 2019-11-14 | End: 2019-11-22 | Stop reason: HOSPADM

## 2019-11-14 RX ORDER — LORAZEPAM 2 MG/ML
0.5 INJECTION INTRAMUSCULAR ONCE
Status: DISCONTINUED | OUTPATIENT
Start: 2019-11-14 | End: 2019-11-15

## 2019-11-14 RX ORDER — LORAZEPAM 2 MG/ML
0.5 INJECTION INTRAMUSCULAR ONCE
Status: COMPLETED | OUTPATIENT
Start: 2019-11-14 | End: 2019-11-14

## 2019-11-14 RX ORDER — ACETAMINOPHEN 325 MG/1
650 TABLET ORAL EVERY 4 HOURS PRN
Status: DISCONTINUED | OUTPATIENT
Start: 2019-11-14 | End: 2019-11-15

## 2019-11-14 RX ORDER — ACETAMINOPHEN 500 MG
1000 TABLET ORAL EVERY 6 HOURS PRN
Status: DISCONTINUED | OUTPATIENT
Start: 2019-11-14 | End: 2019-11-22 | Stop reason: HOSPADM

## 2019-11-14 RX ORDER — POTASSIUM CHLORIDE 7.45 MG/ML
10 INJECTION INTRAVENOUS
Status: DISCONTINUED | OUTPATIENT
Start: 2019-11-14 | End: 2019-11-22 | Stop reason: HOSPADM

## 2019-11-14 RX ORDER — ONDANSETRON 4 MG/1
4 TABLET, ORALLY DISINTEGRATING ORAL EVERY 6 HOURS PRN
Status: DISCONTINUED | OUTPATIENT
Start: 2019-11-14 | End: 2019-11-22 | Stop reason: HOSPADM

## 2019-11-14 RX ORDER — MAGNESIUM CARB/ALUMINUM HYDROX 105-160MG
296 TABLET,CHEWABLE ORAL ONCE
Status: COMPLETED | OUTPATIENT
Start: 2019-11-14 | End: 2019-11-14

## 2019-11-14 RX ORDER — ALPRAZOLAM 0.25 MG
0.5 TABLET ORAL ONCE
Status: COMPLETED | OUTPATIENT
Start: 2019-11-14 | End: 2019-11-14

## 2019-11-14 RX ORDER — SODIUM CHLORIDE 9 MG/ML
INJECTION, SOLUTION INTRAVENOUS CONTINUOUS
Status: DISCONTINUED | OUTPATIENT
Start: 2019-11-14 | End: 2019-11-18

## 2019-11-14 RX ORDER — NALOXONE HYDROCHLORIDE 0.4 MG/ML
.1-.4 INJECTION, SOLUTION INTRAMUSCULAR; INTRAVENOUS; SUBCUTANEOUS
Status: DISCONTINUED | OUTPATIENT
Start: 2019-11-14 | End: 2019-11-22 | Stop reason: HOSPADM

## 2019-11-14 RX ORDER — LANOLIN ALCOHOL/MO/W.PET/CERES
3 CREAM (GRAM) TOPICAL AT BEDTIME
Status: DISCONTINUED | OUTPATIENT
Start: 2019-11-14 | End: 2019-11-22 | Stop reason: HOSPADM

## 2019-11-14 RX ORDER — POTASSIUM CHLORIDE 750 MG/1
20-40 TABLET, EXTENDED RELEASE ORAL
Status: DISCONTINUED | OUTPATIENT
Start: 2019-11-14 | End: 2019-11-22 | Stop reason: HOSPADM

## 2019-11-14 RX ORDER — ONDANSETRON 2 MG/ML
4 INJECTION INTRAMUSCULAR; INTRAVENOUS EVERY 6 HOURS PRN
Status: DISCONTINUED | OUTPATIENT
Start: 2019-11-14 | End: 2019-11-22 | Stop reason: HOSPADM

## 2019-11-14 RX ORDER — LORAZEPAM 2 MG/ML
1 INJECTION INTRAMUSCULAR ONCE
Status: COMPLETED | OUTPATIENT
Start: 2019-11-14 | End: 2019-11-14

## 2019-11-14 RX ORDER — MAGNESIUM CARB/ALUMINUM HYDROX 105-160MG
30 TABLET,CHEWABLE ORAL ONCE
Status: DISCONTINUED | OUTPATIENT
Start: 2019-11-14 | End: 2019-11-14

## 2019-11-14 RX ORDER — POTASSIUM CHLORIDE 29.8 MG/ML
20 INJECTION INTRAVENOUS
Status: DISCONTINUED | OUTPATIENT
Start: 2019-11-14 | End: 2019-11-22 | Stop reason: HOSPADM

## 2019-11-14 RX ADMIN — Medication 10 MEQ: at 22:57

## 2019-11-14 RX ADMIN — SODIUM CHLORIDE 1000 ML: 9 INJECTION, SOLUTION INTRAVENOUS at 22:57

## 2019-11-14 RX ADMIN — LORAZEPAM 0.5 MG: 2 INJECTION INTRAMUSCULAR; INTRAVENOUS at 20:55

## 2019-11-14 RX ADMIN — ACETAMINOPHEN 650 MG: 325 TABLET, FILM COATED ORAL at 17:30

## 2019-11-14 RX ADMIN — LORAZEPAM 1 MG: 2 INJECTION INTRAMUSCULAR; INTRAVENOUS at 20:20

## 2019-11-14 RX ADMIN — MAGNESIUM CITRATE 296 ML: 1.75 LIQUID ORAL at 20:21

## 2019-11-14 RX ADMIN — ALPRAZOLAM 0.5 MG: 0.25 TABLET ORAL at 18:58

## 2019-11-14 NOTE — ED NOTES
"ED Triage Provider Note  [facility]  Encounter Date: Nov 14, 2019    History:  Chief Complaint   Patient presents with     Vaginal Bleeding     Brianne Colunga is a 29 year old female who presents to the ED with vaginal bleeding.  She states that she has had heavy vaginal bleeding and lower abd cramping since this morning.  Her LMP was Nov 4th and normally lasts 2 days.  She states she also had blood in her stool after having a bloody diarrhea this morning.  No vomiting.  No fever.  No anticoagulants.  She denies sexual activity or birth control.       Review of Systems:  Vaginal bleeding, diarrhea with blood    Exam:  /61   Pulse 78   Temp 98.3  F (36.8  C) (Oral)   Resp 16   Ht 1.626 m (5' 4\")   SpO2 95%   BMI 19.48 kg/m    General: No acute distress. Appears stated age.   Cardio: Regular rate, extremities well perfused  Resp: Normal work of breathing, grossly normal respiratory rate  Neuro: Alert. CN II-XII grossly intact. Grossly intact strength.   Lower abdominal tenderness, no guarding or rebound    Medical Decision Making:  Patient arriving to the ED with problem as above. A medical screening exam was performed. Patient with vaginal bleeding beginning today and possibly hematochezia.  Hemodynamically stable.  Will check basic labs and pregnancy test.        Diego Somers MD on 11/14/2019 at 4:18 PM         Diego Somers MD  11/14/19 [id number]    "

## 2019-11-14 NOTE — ED TRIAGE NOTES
Pt BIBA with reports of vaginal bleeding and abdominal pain that started today.Pt also states she noticed blood in stool today.

## 2019-11-15 LAB
ALBUMIN SERPL-MCNC: 3.2 G/DL (ref 3.4–5)
ALP SERPL-CCNC: 55 U/L (ref 40–150)
ALT SERPL W P-5'-P-CCNC: 15 U/L (ref 0–50)
ANION GAP SERPL CALCULATED.3IONS-SCNC: 6 MMOL/L (ref 3–14)
AST SERPL W P-5'-P-CCNC: 12 U/L (ref 0–45)
BASOPHILS # BLD AUTO: 0.1 10E9/L (ref 0–0.2)
BASOPHILS # BLD AUTO: 0.1 10E9/L (ref 0–0.2)
BASOPHILS NFR BLD AUTO: 0.4 %
BASOPHILS NFR BLD AUTO: 0.4 %
BILIRUB SERPL-MCNC: 0.5 MG/DL (ref 0.2–1.3)
BUN SERPL-MCNC: 11 MG/DL (ref 7–30)
CALCIUM SERPL-MCNC: 7.6 MG/DL (ref 8.5–10.1)
CHLORIDE SERPL-SCNC: 108 MMOL/L (ref 94–109)
CO2 SERPL-SCNC: 25 MMOL/L (ref 20–32)
CREAT SERPL-MCNC: 0.52 MG/DL (ref 0.52–1.04)
DIFFERENTIAL METHOD BLD: ABNORMAL
DIFFERENTIAL METHOD BLD: ABNORMAL
EOSINOPHIL # BLD AUTO: 0.1 10E9/L (ref 0–0.7)
EOSINOPHIL # BLD AUTO: 0.1 10E9/L (ref 0–0.7)
EOSINOPHIL NFR BLD AUTO: 0.6 %
EOSINOPHIL NFR BLD AUTO: 0.6 %
ERYTHROCYTE [DISTWIDTH] IN BLOOD BY AUTOMATED COUNT: 11.1 % (ref 10–15)
ERYTHROCYTE [DISTWIDTH] IN BLOOD BY AUTOMATED COUNT: 11.2 % (ref 10–15)
GFR SERPL CREATININE-BSD FRML MDRD: >90 ML/MIN/{1.73_M2}
GLUCOSE BLDC GLUCOMTR-MCNC: 98 MG/DL (ref 70–99)
GLUCOSE SERPL-MCNC: 117 MG/DL (ref 70–99)
HCT VFR BLD AUTO: 33.3 % (ref 35–47)
HCT VFR BLD AUTO: 35.9 % (ref 35–47)
HGB BLD-MCNC: 11.3 G/DL (ref 11.7–15.7)
HGB BLD-MCNC: 11.9 G/DL (ref 11.7–15.7)
IMM GRANULOCYTES # BLD: 0.1 10E9/L (ref 0–0.4)
IMM GRANULOCYTES # BLD: 0.1 10E9/L (ref 0–0.4)
IMM GRANULOCYTES NFR BLD: 0.3 %
IMM GRANULOCYTES NFR BLD: 0.4 %
LYMPHOCYTES # BLD AUTO: 1.7 10E9/L (ref 0.8–5.3)
LYMPHOCYTES # BLD AUTO: 2.1 10E9/L (ref 0.8–5.3)
LYMPHOCYTES NFR BLD AUTO: 12.1 %
LYMPHOCYTES NFR BLD AUTO: 14.3 %
MCH RBC QN AUTO: 31.7 PG (ref 26.5–33)
MCH RBC QN AUTO: 32.4 PG (ref 26.5–33)
MCHC RBC AUTO-ENTMCNC: 33.1 G/DL (ref 31.5–36.5)
MCHC RBC AUTO-ENTMCNC: 33.9 G/DL (ref 31.5–36.5)
MCV RBC AUTO: 95 FL (ref 78–100)
MCV RBC AUTO: 96 FL (ref 78–100)
MONOCYTES # BLD AUTO: 0.9 10E9/L (ref 0–1.3)
MONOCYTES # BLD AUTO: 1 10E9/L (ref 0–1.3)
MONOCYTES NFR BLD AUTO: 6.6 %
MONOCYTES NFR BLD AUTO: 6.8 %
NEUTROPHILS # BLD AUTO: 10.8 10E9/L (ref 1.6–8.3)
NEUTROPHILS # BLD AUTO: 11.4 10E9/L (ref 1.6–8.3)
NEUTROPHILS NFR BLD AUTO: 77.8 %
NEUTROPHILS NFR BLD AUTO: 79.7 %
NRBC # BLD AUTO: 0 10*3/UL
NRBC # BLD AUTO: 0 10*3/UL
NRBC BLD AUTO-RTO: 0 /100
NRBC BLD AUTO-RTO: 0 /100
PHOSPHATE SERPL-MCNC: 2.3 MG/DL (ref 2.5–4.5)
PLATELET # BLD AUTO: 225 10E9/L (ref 150–450)
PLATELET # BLD AUTO: 236 10E9/L (ref 150–450)
POTASSIUM SERPL-SCNC: 3.8 MMOL/L (ref 3.4–5.3)
PROT SERPL-MCNC: 6.6 G/DL (ref 6.8–8.8)
RBC # BLD AUTO: 3.49 10E12/L (ref 3.8–5.2)
RBC # BLD AUTO: 3.75 10E12/L (ref 3.8–5.2)
SODIUM SERPL-SCNC: 139 MMOL/L (ref 133–144)
WBC # BLD AUTO: 13.6 10E9/L (ref 4–11)
WBC # BLD AUTO: 14.7 10E9/L (ref 4–11)

## 2019-11-15 PROCEDURE — 25000128 H RX IP 250 OP 636: Performed by: NURSE PRACTITIONER

## 2019-11-15 PROCEDURE — 00000146 ZZHCL STATISTIC GLUCOSE BY METER IP

## 2019-11-15 PROCEDURE — 85025 COMPLETE CBC W/AUTO DIFF WBC: CPT | Performed by: PHYSICIAN ASSISTANT

## 2019-11-15 PROCEDURE — 25000132 ZZH RX MED GY IP 250 OP 250 PS 637: Performed by: NURSE PRACTITIONER

## 2019-11-15 PROCEDURE — 96375 TX/PRO/DX INJ NEW DRUG ADDON: CPT

## 2019-11-15 PROCEDURE — 99220 ZZC INITIAL OBSERVATION CARE,LEVL III: CPT | Mod: Z6 | Performed by: PHYSICIAN ASSISTANT

## 2019-11-15 PROCEDURE — 36415 COLL VENOUS BLD VENIPUNCTURE: CPT | Performed by: NURSE PRACTITIONER

## 2019-11-15 PROCEDURE — 96361 HYDRATE IV INFUSION ADD-ON: CPT

## 2019-11-15 PROCEDURE — 36415 COLL VENOUS BLD VENIPUNCTURE: CPT | Performed by: PHYSICIAN ASSISTANT

## 2019-11-15 PROCEDURE — 25800030 ZZH RX IP 258 OP 636: Performed by: PHYSICIAN ASSISTANT

## 2019-11-15 PROCEDURE — 25000132 ZZH RX MED GY IP 250 OP 250 PS 637: Performed by: PHYSICIAN ASSISTANT

## 2019-11-15 PROCEDURE — C9113 INJ PANTOPRAZOLE SODIUM, VIA: HCPCS | Performed by: PHYSICIAN ASSISTANT

## 2019-11-15 PROCEDURE — 84100 ASSAY OF PHOSPHORUS: CPT | Performed by: PHYSICIAN ASSISTANT

## 2019-11-15 PROCEDURE — 25000128 H RX IP 250 OP 636

## 2019-11-15 PROCEDURE — 25000128 H RX IP 250 OP 636: Performed by: PHYSICIAN ASSISTANT

## 2019-11-15 PROCEDURE — G0378 HOSPITAL OBSERVATION PER HR: HCPCS

## 2019-11-15 PROCEDURE — 25000132 ZZH RX MED GY IP 250 OP 250 PS 637: Performed by: EMERGENCY MEDICINE

## 2019-11-15 PROCEDURE — 96376 TX/PRO/DX INJ SAME DRUG ADON: CPT

## 2019-11-15 PROCEDURE — 85025 COMPLETE CBC W/AUTO DIFF WBC: CPT | Performed by: NURSE PRACTITIONER

## 2019-11-15 PROCEDURE — 80053 COMPREHEN METABOLIC PANEL: CPT | Performed by: PHYSICIAN ASSISTANT

## 2019-11-15 RX ORDER — LORAZEPAM 2 MG/ML
INJECTION INTRAMUSCULAR
Status: COMPLETED
Start: 2019-11-15 | End: 2019-11-15

## 2019-11-15 RX ORDER — POLYETHYLENE GLYCOL 3350 17 G/17G
17 POWDER, FOR SOLUTION ORAL 3 TIMES DAILY
Status: DISCONTINUED | OUTPATIENT
Start: 2019-11-15 | End: 2019-11-22

## 2019-11-15 RX ORDER — LORAZEPAM 2 MG/ML
0.5 INJECTION INTRAMUSCULAR ONCE
Status: COMPLETED | OUTPATIENT
Start: 2019-11-15 | End: 2019-11-15

## 2019-11-15 RX ORDER — LORAZEPAM 2 MG/ML
1 INJECTION INTRAMUSCULAR ONCE
Status: COMPLETED | OUTPATIENT
Start: 2019-11-15 | End: 2019-11-15

## 2019-11-15 RX ORDER — BISACODYL 10 MG
10 SUPPOSITORY, RECTAL RECTAL ONCE
Status: COMPLETED | OUTPATIENT
Start: 2019-11-15 | End: 2019-11-15

## 2019-11-15 RX ADMIN — Medication 10 MEQ: at 02:26

## 2019-11-15 RX ADMIN — METHYLCELLULOSE 500 MG: 500 TABLET ORAL at 09:20

## 2019-11-15 RX ADMIN — LORAZEPAM 0.5 MG: 2 INJECTION INTRAMUSCULAR at 13:18

## 2019-11-15 RX ADMIN — SODIUM CHLORIDE: 9 INJECTION, SOLUTION INTRAVENOUS at 18:04

## 2019-11-15 RX ADMIN — PANTOPRAZOLE SODIUM 40 MG: 40 INJECTION, POWDER, FOR SOLUTION INTRAVENOUS at 20:58

## 2019-11-15 RX ADMIN — LORAZEPAM 1 MG: 2 INJECTION, SOLUTION INTRAMUSCULAR; INTRAVENOUS at 04:09

## 2019-11-15 RX ADMIN — Medication 10 MEQ: at 01:16

## 2019-11-15 RX ADMIN — MELATONIN TAB 3 MG 3 MG: 3 TAB at 01:48

## 2019-11-15 RX ADMIN — POLYETHYLENE GLYCOL 3350, SODIUM SULFATE ANHYDROUS, SODIUM BICARBONATE, SODIUM CHLORIDE, POTASSIUM CHLORIDE 2000 ML: 236; 22.74; 6.74; 5.86; 2.97 POWDER, FOR SOLUTION ORAL at 01:44

## 2019-11-15 RX ADMIN — Medication 10 MEQ: at 03:33

## 2019-11-15 RX ADMIN — SODIUM CHLORIDE: 9 INJECTION, SOLUTION INTRAVENOUS at 01:49

## 2019-11-15 RX ADMIN — METHYLCELLULOSE 500 MG: 500 TABLET ORAL at 20:57

## 2019-11-15 RX ADMIN — METHYLCELLULOSE 500 MG: 500 TABLET ORAL at 14:03

## 2019-11-15 RX ADMIN — PANTOPRAZOLE SODIUM 40 MG: 40 INJECTION, POWDER, FOR SOLUTION INTRAVENOUS at 08:03

## 2019-11-15 RX ADMIN — BISACODYL 10 MG: 10 SUPPOSITORY RECTAL at 09:19

## 2019-11-15 RX ADMIN — Medication 10 MEQ: at 09:14

## 2019-11-15 RX ADMIN — SODIUM PHOSPHATE, DIBASIC AND SODIUM PHOSPHATE, MONOBASIC 1 ENEMA: 7; 19 ENEMA RECTAL at 04:39

## 2019-11-15 RX ADMIN — LORAZEPAM 0.5 MG: 2 INJECTION, SOLUTION INTRAMUSCULAR; INTRAVENOUS at 13:18

## 2019-11-15 RX ADMIN — Medication 10 MEQ: at 08:03

## 2019-11-15 RX ADMIN — LORAZEPAM 0.5 MG: 2 INJECTION, SOLUTION INTRAMUSCULAR; INTRAVENOUS at 14:01

## 2019-11-15 RX ADMIN — POLYETHYLENE GLYCOL 3350 17 G: 17 POWDER, FOR SOLUTION ORAL at 20:57

## 2019-11-15 RX ADMIN — SODIUM PHOSPHATE 1 ENEMA: 7; 19 ENEMA RECTAL at 16:27

## 2019-11-15 RX ADMIN — ACETAMINOPHEN 1000 MG: 500 TABLET, FILM COATED ORAL at 20:57

## 2019-11-15 RX ADMIN — SODIUM CHLORIDE 1000 ML: 9 INJECTION, SOLUTION INTRAVENOUS at 01:15

## 2019-11-15 ASSESSMENT — PAIN DESCRIPTION - DESCRIPTORS: DESCRIPTORS: CRAMPING

## 2019-11-15 NOTE — ED NOTES
[facility], [place]   ED Nurse to Floor Handoff     Brianne Colunga is a 29 year old female who speaks English and lives with others,  in a group home  They arrived in the ED by ambulance from emergency room    ED Chief Complaint: Vaginal Bleeding    ED Dx;   Final diagnoses:   Slow transit constipation   Urinary retention   Autism spectrum disorder         Needed?: No    Allergies:   Allergies   Allergen Reactions     Seasonal Allergies    .  Past Medical Hx:   Past Medical History:   Diagnosis Date     Acid reflux      Anxiety      Depressive disorder      Irritable bowel syndrome     with chronic constipation and fecal incontinence      Scoliosis       Baseline Mental status: oriented, mentality of 8-10 yr old  Current Mental Status changes: at basesline    Infection present or suspected this encounter: cultures pending  Sepsis suspected: No  Isolation type: No active isolations     Activity level - Baseline/Home:  Independent  Activity Level - Current:   Stand with Assist    Bariatric equipment needed?: No    In the ED these meds were given:   Medications   acetaminophen (TYLENOL) tablet 650 mg (650 mg Oral Given 11/14/19 1730)   0.9% sodium chloride BOLUS (has no administration in time range)   polyethylene glycol (GoLYTELY/NuLYTELY) suspension 2,000 mL (has no administration in time range)   LORazepam (ATIVAN) injection 0.5 mg (0.5 mg Intravenous Not Given 11/14/19 2017)   potassium chloride ER (K-DUR/KLOR-CON M) CR tablet 20-40 mEq (has no administration in time range)   potassium chloride (KLOR-CON) Packet 20-40 mEq (has no administration in time range)   potassium chloride 10 mEq in 100 mL sterile water intermittent infusion (premix) (has no administration in time range)   potassium chloride 10 mEq in 100 mL intermittent infusion with 10 mg lidocaine (has no administration in time range)   potassium chloride 20 mEq in 50 mL intermittent infusion (has no administration  "in time range)   ALPRAZolam (XANAX) tablet 0.5 mg (0.5 mg Oral Given 11/14/19 1858)   magnesium citrate solution 296 mL (296 mLs Oral Given 11/14/19 2021)   LORazepam (ATIVAN) injection 1 mg (1 mg Intravenous Given 11/14/19 2020)   LORazepam (ATIVAN) injection 0.5 mg (0.5 mg Intravenous Given 11/14/19 2055)       Drips running?  No    Home pump  No    Current LDAs  Peripheral IV 11/14/19 Left Upper forearm (Active)   Site Assessment WDL 11/14/2019  4:56 PM   Number of days: 0       Labs results:   Labs Ordered and Resulted from Time of ED Arrival Up to the Time of Departure from the ED   CBC WITH PLATELETS DIFFERENTIAL - Abnormal; Notable for the following components:       Result Value    WBC 13.4 (*)     Absolute Neutrophil 9.4 (*)     All other components within normal limits   BASIC METABOLIC PANEL - Abnormal; Notable for the following components:    Potassium 3.1 (*)     Glucose 146 (*)     All other components within normal limits   HCG QUALITATIVE   ROUTINE UA WITH MICROSCOPIC REFLEX TO CULTURE   MAGNESIUM   BLADDER SCAN       Imaging Studies:   Recent Results (from the past 24 hour(s))   Abdomen XR, 2 vw, flat and upright    Narrative    PRELIMINARY REPORT - The following report is a preliminary  interpretation.      Impression    Impression: Nonobstructive bowel gas pattern with moderate to large  amount of stool within the colon       Recent vital signs:   /61   Pulse 78   Temp 98.3  F (36.8  C) (Oral)   Resp 16   Ht 1.626 m (5' 4\")   SpO2 95%   BMI 19.48 kg/m      Sage Coma Scale Score: 15 (11/14/19 1613)       Cardiac Rhythm: Normal Sinus  Pt needs tele? No  Skin/wound Issues: None    Code Status: Full Code    Pain control: fair    Nausea control: pt had none    Abnormal labs/tests/findings requiring intervention: see results - XR and WBC    Family present during ED course? No   Family Comments/Social Situation comments: mother was called and is aware of admission to OBS    Tasks needing " completion: None    Jerry Wills, RN  [phone] [facility]

## 2019-11-15 NOTE — CONSULTS
Gastroenterology Consultation  Brianne Colunga [id number] 29 year old 1990  11/15/2019        Date of Admission: 11/14/2019  Reason for consult: Constipation  Requesting physician: Dr. Neelam crow. providers found       Reason for Consultation:   Constipation         HPI:   Brianne Colunga is a 29 year old female with a PMHX significant for Autism spectrum disorder, bipolar disorder, MDD, anxiety, chronic constipation (slow transit, based on Sitz Marker study), and chronic fecal incontinence who presents with abdominal pain in the setting of constipation.     Prior to admission the patient reports that for the day prior to admission she had lower abdominal cramping. She has been having ongoing issues with constipation for years. She sits on the toilet for 60 minutes at a time, strains with bowel movements and has 2 bowel movements per week. Denies any nausea or emesis. She states she is scared of having bowel movements because when she gets this constipated it hurts. She has been trying to take citrucel TID, but hasn't gotten down a bowel regimen fully. In the setting of constipation, she also has issues with incontinence of stool and urine and difficulty initiating her urinary stream. Denies fevers or chills.     Of note, she has had chronic constipation since at least 2015. She has undergone a colonoscopy in 2016, which was normal per report. She had a Sitz Marker study which showed slow colonic transit. She was supposed to undergo further pelvic floor PT and testing, but has not done so yet. She used to live in [place] (where all her sibling and parents live), but now she lives with a roommate in [place] (moved her for theater and to be in a big city) and lives in a group home. She has not established care with a GI provider in [place]. Of note, she was seen by CRS in the clinic on 6/20/19. It was recommended that she start fiber supplement and Citrucel once a day and titrate up to TID. She was also  referred to Pelvic floor PT for biofeedback training due to her fecal incontinence.         Past Medical History:     Past Medical History:   Diagnosis Date     Acid reflux      Anxiety      Depressive disorder      Irritable bowel syndrome     with chronic constipation and fecal incontinence      Scoliosis           Past Surgical History:   - Colonoscopy (2016): Unclear results         Medications:     Current Facility-Administered Medications   Medication     acetaminophen (TYLENOL) tablet 1,000 mg     LORazepam (ATIVAN) injection 0.5 mg     melatonin tablet 3 mg     methylcellulose (CITRUCEL) tablet 500 mg     naloxone (NARCAN) injection 0.1-0.4 mg     ondansetron (ZOFRAN-ODT) ODT tab 4 mg    Or     ondansetron (ZOFRAN) injection 4 mg     pantoprazole (PROTONIX) 40 mg IV push injection     potassium chloride (KLOR-CON) Packet 20-40 mEq     potassium chloride 10 mEq in 100 mL intermittent infusion with 10 mg lidocaine     potassium chloride 10 mEq in 100 mL sterile water intermittent infusion (premix)     potassium chloride 20 mEq in 50 mL intermittent infusion     potassium chloride ER (K-DUR/KLOR-CON M) CR tablet 20-40 mEq     sodium chloride 0.9% infusion     sodium phosphate (FLEET ENEMA) 1 enema          Allergies   - Seasonal allergies         Social History:   - Lives with a roommate  - Denies alcohol or tobacco        Family History:   Reviewed and edited as appropriate  Family History   Problem Relation Age of Onset     Irritable Bowel Syndrome Mother      Depression Mother      Anxiety Disorder Mother      Autism Spectrum Disorder Mother      Heart Disease Father      Prostate Cancer Paternal Grandfather      Autism Spectrum Disorder Sister         Low functioning Autism, lives in a group home     Macular Degeneration No family hx of      Glaucoma No family hx of           Review of Systems:   A complete 10 point review of systems was obtained.  Please see the HPI for pertinent positives and negatives.   "All other systems were reviewed and were found to be negative.          Physical Exam:   VS:  /71 (BP Location: Left arm)   Pulse 67   Temp 98.8  F (37.1  C) (Oral)   Resp 16   Ht 1.626 m (5' 4\")   SpO2 98%   BMI 19.48 kg/m      Wt:   Wt Readings from Last 2 Encounters:   08/14/19 51.5 kg (113 lb 8 oz)   07/30/19 49 kg (108 lb)      Constitutional: , no acute distress  Eyes: Sclera anicteric/injected  HEENT: Normal oropharynx without ulcers or exudate, mucus membranes moist  CV: RRR, no m/r/g  Respiratory: CTAB  Abd: Mild distension, hypoactive BS, mild TTP throughout, no rebound or guarding   Rectal: Firm stool felt on initial entry into rectal vault  Skin: warm, perfused  Neuro: AAO x 3         Laboratory:   BMP  Recent Labs   Lab 11/15/19  0453 11/14/19  1655    137   POTASSIUM 3.8 3.1*   CHLORIDE 108 100   BOB 7.6* 9.5   CO2 25 32   BUN 11 17   CR 0.52 0.76   * 146*     CBC  Recent Labs   Lab 11/15/19  0453 11/14/19  1655   WBC 13.6* 13.4*   RBC 3.49* 4.26   HGB 11.3* 13.6   HCT 33.3* 39.7   MCV 95 93   MCH 32.4 31.9   MCHC 33.9 34.3   RDW 11.2 11.2    259     LFTs  Recent Labs   Lab 11/15/19  0453   ALKPHOS 55   AST 12   ALT 15   BILITOTAL 0.5   PROTTOTAL 6.6*   ALBUMIN 3.2*       (7/2019)         Imaging/Procedures/Studies:   Abdominal X-ray 11/14/2019  Nonobstructive bowel gas pattern with moderate to large  amount of stool within the colon         Assessment and Plan:   Brianne Colunga is a 29 year old female with a PMHX significant for Autism spectrum disorder, bipolar disorder, MDD, anxiety, chronic constipation (slow transit, based on Sitz Marker study), and chronic fecal incontinence who presents with abdominal pain in the setting of constipation.          Recommendations:     #. Constipation, chronic  #. Abdominal pain  Patient with a significant stool burden on abdominal x-ray (moderate to large), along with significant stool burden in the rectum noted on rectal " exam. Patient with chronic constipation, likely 2/2 slow transit constipation based on previous Sitz Marker study. Good rectal tone on rectal exam, but large firm stool felt. Colonoscopy in 2016 without abnormalities, but unable to review colonoscopy. No electrolyte abnormalities, no medications/narcotics likely contributing, TSH wnl, and no immobility  - Recommend enemas PRN   - Recommend manual disimpaction given stool burden in rectum  - Can provide anti-anxiety medications, such as ativan PRN, given patient's anxiety related to enemas and manual disimpactions  - Recommend nothing by mouth until large burden of stool per rectum   * Once able to take medications by mouth (after having stools), recommend starting miralax TID + citrucel TID  - Recommend walking/mobility QID   - Recommend nutrition consult once able to take PO  - Fluids, electrolyte replacement and supportive care per primary team  - Upon discharge, will need follow up in general GI clinic and referral to pelvic floor clinic ([email or web address])    It has been a pleasure to participate in the care of this patient.  Patient discussed with GI staff, Dr. Martin.  Please do not hesitate to contact the GI service with any questions or concerns.     Oly Boswell (Lizzie)  Gastroenterology Fellow  Pager [phone]

## 2019-11-15 NOTE — ED PROVIDER NOTES
History     Chief Complaint   Patient presents with     Vaginal Bleeding     HPI  Brianne Colunga is a 29 year old female with a history of Autism spectrum disorder, IBS and anxiety who presents with vaginal bleeding and abdominal pain.  She states that she has had heavy vaginal bleeding and lower abd cramping since this morning.  Her LMP was Nov 4th and normally lasts 2 days.  She states she also had blood in her stool after having a bloody diarrhea this morning, although wasn't sure if this was from her vaginal bleeding. She has had no vomiting.  She has had no fever or chills.  In reviewing her medical history, she has had ongoing problems with constipation and encopresis.  She was recently referred to GI for evaluation of her slow transit constipation.  She has a history of UTIs and has been leaking some urine.  She wears depends for her encopresis.         PAST MEDICAL HISTORY:   Past Medical History:   Diagnosis Date     Acid reflux      Anxiety      Depressive disorder      Irritable bowel syndrome     with chronic constipation and fecal incontinence      Scoliosis        PAST SURGICAL HISTORY:   Past Surgical History:   Procedure Laterality Date     COLONOSCOPY         FAMILY HISTORY:   Family History   Problem Relation Age of Onset     Irritable Bowel Syndrome Mother      Depression Mother      Anxiety Disorder Mother      Autism Spectrum Disorder Mother      Heart Disease Father      Prostate Cancer Paternal Grandfather      Autism Spectrum Disorder Sister         Low functioning Autism, lives in a group home     Macular Degeneration No family hx of      Glaucoma No family hx of        SOCIAL HISTORY:   Social History     Tobacco Use     Smoking status: Never Smoker     Smokeless tobacco: Never Used   Substance Use Topics     Alcohol use: Never     Frequency: Never       Patient's Medications   New Prescriptions    No medications on file   Previous Medications    MELATONIN 3 MG TABLET    Take 3 mg by  "mouth    POLYETHYLENE GLYCOL (MIRALAX/GLYCOLAX) POWDER    Take 17 g by mouth    TRAZODONE (DESYREL) 50 MG TABLET       Modified Medications    No medications on file   Discontinued Medications    No medications on file          Allergies   Allergen Reactions     Seasonal Allergies           I have reviewed the Medications, Allergies, Past Medical and Surgical History, and Social History in the Epic system.    Review of Systems   All other systems reviewed and are negative.      Physical Exam   BP: 124/61  Pulse: 78  Temp: 98.3  F (36.8  C)  Resp: 16  Height: 162.6 cm (5' 4\")  SpO2: 95 %      Physical Exam  Vitals signs and nursing note reviewed.   Constitutional:       General: She is not in acute distress.     Appearance: She is not diaphoretic.   HENT:      Head: Normocephalic and atraumatic.   Eyes:      Conjunctiva/sclera: Conjunctivae normal.      Pupils: Pupils are equal, round, and reactive to light.   Neck:      Musculoskeletal: Normal range of motion and neck supple.   Cardiovascular:      Rate and Rhythm: Normal rate.   Pulmonary:      Effort: Pulmonary effort is normal. No respiratory distress.   Abdominal:      General: There is no distension.      Palpations: Abdomen is soft.      Tenderness: There is no abdominal tenderness. There is no guarding.   Genitourinary:     Comments: encopresis  Musculoskeletal: Normal range of motion.   Skin:     General: Skin is warm and dry.   Neurological:      Mental Status: She is alert and oriented to person, place, and time.   Psychiatric:      Comments: anxious         ED Course        Procedures             Critical Care time:  none             Labs Ordered and Resulted from Time of ED Arrival Up to the Time of Departure from the ED   CBC WITH PLATELETS DIFFERENTIAL - Abnormal; Notable for the following components:       Result Value    WBC 13.4 (*)     Absolute Neutrophil 9.4 (*)     All other components within normal limits   BASIC METABOLIC PANEL - Abnormal; " Notable for the following components:    Potassium 3.1 (*)     Glucose 146 (*)     All other components within normal limits   HCG QUALITATIVE   ROUTINE UA WITH MICROSCOPIC REFLEX TO CULTURE   BLADDER SCAN            Assessments & Plan (with Medical Decision Making)   This is a 30 yo F with autism, history of constipation and encopresis who presents with abdominal pain and vaginal bleeding.  She has significant anxiety which prevents her from cooperating with a pelvic exam, but it appears her bleeding is minimal as she has been wearing the same depends while in the ER. She does show signs of encopresis.  Her hemoglobin is 13.6 and she is not anticoagulated.  She reported some rectal bleeding, but I suspect this may be related to her vaginal bleeding. There is no blood visualized.   Her hcg is negative.  An abdominal xray showed a significant stool burden.  I suspect her constipation is contributing to urinary retention and with her encopresis she is at risk for a UTI.  She was treated with Xanax 0.5mg and then Ativan 1mg IV.  Hopefully, she will allow us to cath for a urine specimen, but her anxiety limits procedures. Will also start Mg citrate and half a golytley bowel prep.     Addendum:  Was able to calm her anxiety with a total of Xanax 0.5mg Po and Ativan 2mg IV.  Straight cath with 500cc of dark urine.  UA pending.  Rectal vault full of stool but did not tolerate digital disimpaction or speculum exam.  Will admit to obs for bowel clean out.          I have reviewed the nursing notes.    I have reviewed the findings, diagnosis, plan and need for follow up with the patient.    New Prescriptions    No medications on file       Final diagnoses:   None       11/14/2019   [facility], [place], EMERGENCY DEPARTMENT     Diego Somers MD  11/14/19 5943

## 2019-11-15 NOTE — ED NOTES
This RN attempted to straight cath pt and was unsuccessful. Catheter was inserted approx 3 inches and pt became very upset and did not tolerate further insertion. Catheter removed. Pt going to attempt to urinate again. If unsuccessful will attempt reinsertion after ativan.

## 2019-11-15 NOTE — PROGRESS NOTES
attempted to give Fleets enema x 2.     Gave 0.5mg IV ativan then attempted enema. Pt declined. Crying and covering backside. Notified MARGIE Kruse.  Then Gave another 0.5mg IV ativan. Placed pt on continuous plus ox. Attempted the Fleets a second time with another female staff present in room. Pt again cries and declines. Covering bottom with her hand. Tried deep breathing and calming mechanisms to relax pt. Pt still declined Fleets.

## 2019-11-15 NOTE — PROGRESS NOTES
Admitted for abdominal pain to obs unit.  Xray shows large amount of stool and ED provider found large amount of stool on rectal exam.  She has hxof this with slow transit constipation and was suppose to be seeing GI in the outpatient setting.  We have started treatment with golytely (2/3 of bottle drank), as well as fleet enema.  The patient says she is having watering stool coming out.  We will give her a suppository and try enema if needed later.  We have contacted GI for further recommendations. Her abdomen is flat and soft.  She says it is tender.  Her wbc is slightly elevated.  Normal temperature. As stated above, she has had an xray of her abdomen but no CT.  We will consult GI for treatment of her constipation and monitor her vitals, wbc, abdominal pain.  If all resolve with treatment of constipation then no CT will be ordered. If her wbc elevates, she has fever or worsening/continued pain despite treatment of constipation, we will order a CT abdomen/pelvis.     Note:  The patient says she lives in a group home and is her own legal guardian.

## 2019-11-15 NOTE — PROGRESS NOTES
Care Coordinator - Discharge Planning    Admission Date/Time:  11/14/2019  Attending MD:  Neelam att. providers found     Data  Chart reviewed, discussed with interdisciplinary team.   Patient was admitted for:   1. Slow transit constipation    2. Urinary retention    3. Autism spectrum disorder         Assessment   Concerns with insurance coverage for discharge needs: None.  Current Living Situation: Patient lives in a group home.  Support System: Supportive  Services Involved: Group Home staff  Transportation at Discharge: TBD  Transportation to Medical Appointments:    - Name of caregiver: Self/ Staff  Barriers to Discharge: Medical stability     Pt status reviewed/discussed during care team rounds.  Pt admitted with constipation.  GI consulted.   Per chart review and discussion with provider pt resides in a .   Provider anticipates that the patient may be able to discharge later this afternoon or tomorrow pending GI recommendations and pt ability to have a bowel movement.     Spoke with JAVIER Jones  [phone].  Karen [facility] [phone] or [phone].  Per discussion with Karen pt is her own decision maker.   left for [facility] requesting return call.     1310:  2nd  left for  Director.  Will f/u with pt shortly.    1400:  Attempted to meet with pt however she was sound asleep.  Writer spoke with Zuri, staff person with [facility] [phone].  Per Steph she is unable to address any plan for patient.  She did agree to email [facility].  Writer left another  for  director.     1445:  Received call from [facility]  Director.  Per discussion with [facility]  will be able to accept the patient back over the weekend as long as no medication changes are made.   staff may be able to transport pt home.  Weekend RNCC can call the following #'s to arrange weekend discharge:   # [phone] - address is [address] [place] [address].     Gabrielle  Supervisor [phone].     requested that when discharge final discharge orders be faxed to [phone]        Coordination of Care and Referrals: Provided patient/family with options for Group Home.      Plan  Anticipated Discharge Date: TBD  Anticipated Discharge Plan:  Return to     Buffy Echols RN BSN, PHN RN Care Coordinator  Internal Medicine   [phone]  Pager: [phone]  [facility] RN Care Coordinator job code * * * [phone]  [facility] [place]  11/15/2019 11:13 AM

## 2019-11-15 NOTE — PLAN OF CARE
- Diagnostic tests and consults completed and resulted: No  - Vital signs normal or at patient baseline: No   - Tolerating oral intake to maintain hydration: No, pt is NPO   - Adequate pain control on oral analgesics: No   - Returns to baseline functional status: No   - Safe disposition plan has been identified: Pending   - GI consult completed with dispo plan: No

## 2019-11-15 NOTE — PROGRESS NOTES
- Diagnostic tests and consults completed and resulted: yes  - Vital signs normal or at patient baseline:yes  - Tolerating oral intake to maintain hydration: No, pt is NPO   - Adequate pain control on oral analgesics: No   - Returns to baseline functional status: No   - Safe disposition plan has been identified: Pending   - GI consult completed with dispo plan: yes    Pt hasn't produced a formed stool. Plan to give anti-anxiety medications and then enema (per GI MD)    K 3.8 replaced via IV

## 2019-11-15 NOTE — PROGRESS NOTES
- Diagnostic tests and consults completed and resulted: No  - Vital signs normal or at patient baseline:yes  - Tolerating oral intake to maintain hydration: No, pt is NPO   - Adequate pain control on oral analgesics: No   - Returns to baseline functional status: No   - Safe disposition plan has been identified: Pending   - GI consult completed with dispo plan: No

## 2019-11-15 NOTE — PROGRESS NOTES
Called 6D PA - Updated that pt refusing enema at this time, and that pt is having a difficult time drinking mag citrate or GoLYTELY. Provider aware. Also, confirmed that provider does want 2 NS boluses. First bolus is currently infusing.

## 2019-11-15 NOTE — PROGRESS NOTES
"[facility]  Daily Progress Note          Assessment & Plan:   Brianne Colunga is a 29 year old female with a PMH of Autism spectrum disorder, bipolar disorder, MDD, anxiety, IBS with constipation and diarrhea, chronic fecal incontinence who presents with vaginal bleeding and abdominal pain.       ##Abdominal Pain:   ##Chronic Fecal Incontinence:    ##Chronic Constipation   ##Chronic Encopresis:   Presented with lower abdominal pain and inability to urinate requiring straight cath in the ED.. Abdominal x-ray showed, \"Nonobstructive bowel gas pattern with moderate to large  amount of stool within the colon exam.\"  Seen by CRS in clinic on 6/20/19 and recommended that she start fiber supplement and Citrucel once a day and titrate up to TID. She was also referred to Pelvic floor PT for biofeedback training due to her fecal incontinence. She was referred to GI for slow transit constipation with upcoming appointment 11/25. During her psychiatry admission 7/26-7/30/19 she had issues with fecal soiling in the unit and the medical team was consulted and diagnosed with fecal impaction; placed on laxatives with good effect and with the relief of her bowel symptoms, her mental state was reported to have improved. She was referred to GI and CRS again at that time. VSS, afebrile. Hcg negative. Rectal exam was done in the ED. Per ED MD fvault full of stool but did not tolerate digital disimpaction or speculum exam. Admitted to observation for ongoing management of her constipation and stool impaction. Symptoms ikely due to fecal impaction. Leukocytosis could be due to dehydration/stress and less likely infection. She was treated with Golytely 2L x 1 and Fleet Enema x 1 now. Some liquid stool out-put  - NPO  - MIVF with NS at 125ml/hr  - Protonix 40mg IV BID  - GI consult  - Suppository now  -Continue Golytely  - Fleets enema if no results     ##Vaginal Bleeding  It is unclear the details of " "her vaginal bleed however per observation she has not had any bleeding in her diaper or on superficial visual exam as she declined and was unable to tolerate a speculum exam.  Due to significant anxiety, unable to  which prevented her from cooperating with a pelvic exam.   -Monitor  -Recheck CBC this afternoon   -out-patient follow-up       ##Leukocytosis: WBC 13.4 in the ED and 13.6 this am. Unclear etiology.   -Recheck this afternoon  -Consider further imaging if febrile      ##Urinary Retention: inability to urinate requiring straight cath in the ED with 500 mL of dark urine. Now urinating without difficulty. UA with 15 RBC's and trace blood. Could be related to vaginal bleeding as above.   -Follow-up for repeat out-patient     ##Hypokalemia: K 3.1, now 3.8 after replacement.   -monitor        ##Depression, Anxiety: Patient is obviously very anxious and frightened by her constipation and defecation. She is irritated with questions. Does not actively report SI.   - Assessment for depression and SI when feeling better  - If endorsing anxiety, depression or SI after constipation improved, consider Psychaitry     ##Insomnia:   - Continue with PTA melatonin        FEN: NPO, advance as tolerated if cleared by GI  Lines: PIV  Prophylaxis: Early ambulation          Consults:   GI         Discharge Planning:   Back to group home pending improved abdominal pain an stool out-put.         Interval History:   Pain improved but ongoing, not much stool out-put.     ROS:   Constitutional: No fevers/chills.  Cardiovascular: No chest pain or palpitations.   GI:  No N/V.               Physical Exam:   /67 (BP Location: Left arm)   Pulse 67   Temp 98.9  F (37.2  C) (Oral)   Resp 18   Ht 1.626 m (5' 4\")   SpO2 98%   BMI 19.48 kg/m       GENERAL: Alert. NAD.   HEENT: Anicteric sclera. Mucous membranes moist.   CV: RRR. S1, S2. No murmurs appreciated.   RESPIRATORY: Effort normal. Lungs CTAB with no wheezing, rales, " rhonchi.   GI: Abdomen soft and non distended with normoactive bowel sounds present in all quadrants. No tenderness, rebound, guarding.   NEUROLOGICAL: No focal deficits. Moves all extremities.    EXTREMITIES: No peripheral edema. Intact bilateral pedal pulses.   SKIN: No jaundice. No rashes.     Medication list reviewed.   Today's labs and imaging were reviewed.     YAS Loco, CNP  Emergency Department Observation Unit    Addendum: Mostly liquid stool out-put today. Refused enema after ativan. Will allow to rest and then reassess and continue to encourage enema. Follow-up on GI recommendations.    YAS Loco, CNP  Emergency Department Observation Unit    Addendum 1800: Patient declined enema. Did go into the room with the nurse. Patient agreed to enema. Per nursing no stool felt with insertion. + Large amount of stool out-put after enema, per nursing. GI consult pending. Pain improved. Miralax TID, citrucel TID. WBC 14.7. Unclear etiology. Afebrile. UA negative. Check in the am or later tonight. If trending up, consider imaging.     YAS Loco, CNP  Emergency Department Observation Unit
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
## 2019-11-16 ENCOUNTER — APPOINTMENT (OUTPATIENT)
Dept: CT IMAGING | Facility: CLINIC | Age: 29
DRG: 389 | End: 2019-11-16
Attending: NURSE PRACTITIONER
Payer: COMMERCIAL

## 2019-11-16 LAB
ALBUMIN SERPL-MCNC: 3.3 G/DL (ref 3.4–5)
ALP SERPL-CCNC: 69 U/L (ref 40–150)
ALT SERPL W P-5'-P-CCNC: 15 U/L (ref 0–50)
ANION GAP SERPL CALCULATED.3IONS-SCNC: 6 MMOL/L (ref 3–14)
AST SERPL W P-5'-P-CCNC: 5 U/L (ref 0–45)
BASOPHILS # BLD AUTO: 0.1 10E9/L (ref 0–0.2)
BASOPHILS NFR BLD AUTO: 0.4 %
BILIRUB SERPL-MCNC: 0.8 MG/DL (ref 0.2–1.3)
BUN SERPL-MCNC: 6 MG/DL (ref 7–30)
CALCIUM SERPL-MCNC: 8.8 MG/DL (ref 8.5–10.1)
CHLORIDE SERPL-SCNC: 104 MMOL/L (ref 94–109)
CO2 SERPL-SCNC: 27 MMOL/L (ref 20–32)
CREAT SERPL-MCNC: 0.64 MG/DL (ref 0.52–1.04)
DIFFERENTIAL METHOD BLD: ABNORMAL
EOSINOPHIL # BLD AUTO: 0.2 10E9/L (ref 0–0.7)
EOSINOPHIL NFR BLD AUTO: 1.6 %
ERYTHROCYTE [DISTWIDTH] IN BLOOD BY AUTOMATED COUNT: 11 % (ref 10–15)
GFR SERPL CREATININE-BSD FRML MDRD: >90 ML/MIN/{1.73_M2}
GLUCOSE SERPL-MCNC: 92 MG/DL (ref 70–99)
HCT VFR BLD AUTO: 36.4 % (ref 35–47)
HGB BLD-MCNC: 12.3 G/DL (ref 11.7–15.7)
IMM GRANULOCYTES # BLD: 0 10E9/L (ref 0–0.4)
IMM GRANULOCYTES NFR BLD: 0.3 %
LACTATE BLD-SCNC: 0.8 MMOL/L (ref 0.7–2)
LYMPHOCYTES # BLD AUTO: 2 10E9/L (ref 0.8–5.3)
LYMPHOCYTES NFR BLD AUTO: 14.4 %
MAGNESIUM SERPL-MCNC: 2.4 MG/DL (ref 1.6–2.3)
MCH RBC QN AUTO: 31.7 PG (ref 26.5–33)
MCHC RBC AUTO-ENTMCNC: 33.8 G/DL (ref 31.5–36.5)
MCV RBC AUTO: 94 FL (ref 78–100)
MONOCYTES # BLD AUTO: 1.1 10E9/L (ref 0–1.3)
MONOCYTES NFR BLD AUTO: 7.5 %
NEUTROPHILS # BLD AUTO: 10.7 10E9/L (ref 1.6–8.3)
NEUTROPHILS NFR BLD AUTO: 75.8 %
NRBC # BLD AUTO: 0 10*3/UL
NRBC BLD AUTO-RTO: 0 /100
PLATELET # BLD AUTO: 230 10E9/L (ref 150–450)
POTASSIUM SERPL-SCNC: 3.7 MMOL/L (ref 3.4–5.3)
PROT SERPL-MCNC: 7.2 G/DL (ref 6.8–8.8)
RBC # BLD AUTO: 3.88 10E12/L (ref 3.8–5.2)
SODIUM SERPL-SCNC: 138 MMOL/L (ref 133–144)
WBC # BLD AUTO: 14.1 10E9/L (ref 4–11)

## 2019-11-16 PROCEDURE — 83605 ASSAY OF LACTIC ACID: CPT

## 2019-11-16 PROCEDURE — 74177 CT ABD & PELVIS W/CONTRAST: CPT

## 2019-11-16 PROCEDURE — 96376 TX/PRO/DX INJ SAME DRUG ADON: CPT

## 2019-11-16 PROCEDURE — 25000128 H RX IP 250 OP 636: Performed by: NURSE PRACTITIONER

## 2019-11-16 PROCEDURE — 25000132 ZZH RX MED GY IP 250 OP 250 PS 637: Performed by: NURSE PRACTITIONER

## 2019-11-16 PROCEDURE — 36415 COLL VENOUS BLD VENIPUNCTURE: CPT | Performed by: NURSE PRACTITIONER

## 2019-11-16 PROCEDURE — C9113 INJ PANTOPRAZOLE SODIUM, VIA: HCPCS | Performed by: PHYSICIAN ASSISTANT

## 2019-11-16 PROCEDURE — 99226 ZZC SUBSEQUENT OBSERVATION CARE,LEVEL III: CPT | Mod: Z6 | Performed by: NURSE PRACTITIONER

## 2019-11-16 PROCEDURE — 85025 COMPLETE CBC W/AUTO DIFF WBC: CPT | Performed by: NURSE PRACTITIONER

## 2019-11-16 PROCEDURE — 25800030 ZZH RX IP 258 OP 636: Performed by: NURSE PRACTITIONER

## 2019-11-16 PROCEDURE — 96361 HYDRATE IV INFUSION ADD-ON: CPT

## 2019-11-16 PROCEDURE — 25000128 H RX IP 250 OP 636: Performed by: PHYSICIAN ASSISTANT

## 2019-11-16 PROCEDURE — 80053 COMPREHEN METABOLIC PANEL: CPT | Performed by: NURSE PRACTITIONER

## 2019-11-16 PROCEDURE — 40000141 ZZH STATISTIC PERIPHERAL IV START W/O US GUIDANCE

## 2019-11-16 PROCEDURE — 25000128 H RX IP 250 OP 636: Performed by: STUDENT IN AN ORGANIZED HEALTH CARE EDUCATION/TRAINING PROGRAM

## 2019-11-16 PROCEDURE — 83735 ASSAY OF MAGNESIUM: CPT | Performed by: NURSE PRACTITIONER

## 2019-11-16 PROCEDURE — 25000132 ZZH RX MED GY IP 250 OP 250 PS 637: Performed by: PHYSICIAN ASSISTANT

## 2019-11-16 PROCEDURE — G0378 HOSPITAL OBSERVATION PER HR: HCPCS

## 2019-11-16 PROCEDURE — 25800030 ZZH RX IP 258 OP 636: Performed by: PHYSICIAN ASSISTANT

## 2019-11-16 RX ORDER — POTASSIUM CHLORIDE 750 MG/1
20-40 TABLET, EXTENDED RELEASE ORAL
Status: DISCONTINUED | OUTPATIENT
Start: 2019-11-16 | End: 2019-11-16

## 2019-11-16 RX ORDER — LORAZEPAM 0.5 MG/1
0.5 TABLET ORAL ONCE
Status: COMPLETED | OUTPATIENT
Start: 2019-11-16 | End: 2019-11-16

## 2019-11-16 RX ORDER — POTASSIUM CHLORIDE 29.8 MG/ML
20 INJECTION INTRAVENOUS
Status: DISCONTINUED | OUTPATIENT
Start: 2019-11-16 | End: 2019-11-16

## 2019-11-16 RX ORDER — MAGNESIUM CARB/ALUMINUM HYDROX 105-160MG
296 TABLET,CHEWABLE ORAL ONCE
Status: DISCONTINUED | OUTPATIENT
Start: 2019-11-16 | End: 2019-11-19

## 2019-11-16 RX ORDER — POTASSIUM CL/LIDO/0.9 % NACL 10MEQ/0.1L
10 INTRAVENOUS SOLUTION, PIGGYBACK (ML) INTRAVENOUS
Status: DISCONTINUED | OUTPATIENT
Start: 2019-11-16 | End: 2019-11-16

## 2019-11-16 RX ORDER — BISACODYL 5 MG
10 TABLET, DELAYED RELEASE (ENTERIC COATED) ORAL DAILY PRN
Status: DISCONTINUED | OUTPATIENT
Start: 2019-11-16 | End: 2019-11-22 | Stop reason: HOSPADM

## 2019-11-16 RX ORDER — MAGNESIUM SULFATE HEPTAHYDRATE 40 MG/ML
4 INJECTION, SOLUTION INTRAVENOUS EVERY 4 HOURS PRN
Status: DISCONTINUED | OUTPATIENT
Start: 2019-11-16 | End: 2019-11-22 | Stop reason: HOSPADM

## 2019-11-16 RX ORDER — IOPAMIDOL 755 MG/ML
69 INJECTION, SOLUTION INTRAVASCULAR ONCE
Status: COMPLETED | OUTPATIENT
Start: 2019-11-16 | End: 2019-11-16

## 2019-11-16 RX ORDER — LORAZEPAM 2 MG/ML
1 INJECTION INTRAMUSCULAR ONCE
Status: COMPLETED | OUTPATIENT
Start: 2019-11-16 | End: 2019-11-16

## 2019-11-16 RX ORDER — POTASSIUM CHLORIDE 7.45 MG/ML
10 INJECTION INTRAVENOUS
Status: DISCONTINUED | OUTPATIENT
Start: 2019-11-16 | End: 2019-11-16

## 2019-11-16 RX ORDER — POTASSIUM CHLORIDE 1.5 G/1.58G
20-40 POWDER, FOR SOLUTION ORAL
Status: DISCONTINUED | OUTPATIENT
Start: 2019-11-16 | End: 2019-11-16

## 2019-11-16 RX ORDER — LORAZEPAM 2 MG/ML
0.5 INJECTION INTRAMUSCULAR ONCE
Status: COMPLETED | OUTPATIENT
Start: 2019-11-16 | End: 2019-11-16

## 2019-11-16 RX ADMIN — METHYLCELLULOSE 500 MG: 500 TABLET ORAL at 08:15

## 2019-11-16 RX ADMIN — ACETAMINOPHEN 500 MG: 500 TABLET, FILM COATED ORAL at 08:29

## 2019-11-16 RX ADMIN — POTASSIUM CHLORIDE 20 MEQ: 750 TABLET, EXTENDED RELEASE ORAL at 08:30

## 2019-11-16 RX ADMIN — POLYETHYLENE GLYCOL 3350 17 G: 17 POWDER, FOR SOLUTION ORAL at 14:31

## 2019-11-16 RX ADMIN — SODIUM CHLORIDE: 9 INJECTION, SOLUTION INTRAVENOUS at 08:38

## 2019-11-16 RX ADMIN — MELATONIN TAB 3 MG 3 MG: 3 TAB at 22:33

## 2019-11-16 RX ADMIN — SODIUM CHLORIDE 1000 ML: 9 INJECTION, SOLUTION INTRAVENOUS at 07:10

## 2019-11-16 RX ADMIN — LORAZEPAM 1 MG: 2 INJECTION, SOLUTION INTRAMUSCULAR; INTRAVENOUS at 13:43

## 2019-11-16 RX ADMIN — IOPAMIDOL 69 ML: 755 INJECTION, SOLUTION INTRAVENOUS at 11:21

## 2019-11-16 RX ADMIN — SODIUM PHOSPHATE 1 ENEMA: 7; 19 ENEMA RECTAL at 14:06

## 2019-11-16 RX ADMIN — METHYLCELLULOSE 500 MG: 500 TABLET ORAL at 14:30

## 2019-11-16 RX ADMIN — LORAZEPAM 0.5 MG: 0.5 TABLET ORAL at 22:56

## 2019-11-16 RX ADMIN — PANTOPRAZOLE SODIUM 40 MG: 40 INJECTION, POWDER, FOR SOLUTION INTRAVENOUS at 08:15

## 2019-11-16 RX ADMIN — ACETAMINOPHEN 1000 MG: 500 TABLET, FILM COATED ORAL at 19:20

## 2019-11-16 RX ADMIN — LORAZEPAM 0.5 MG: 2 INJECTION, SOLUTION INTRAMUSCULAR; INTRAVENOUS at 00:34

## 2019-11-16 RX ADMIN — PANTOPRAZOLE SODIUM 40 MG: 40 INJECTION, POWDER, FOR SOLUTION INTRAVENOUS at 19:50

## 2019-11-16 ASSESSMENT — PAIN DESCRIPTION - DESCRIPTORS: DESCRIPTORS: CRAMPING

## 2019-11-16 NOTE — PLAN OF CARE
Diagnostic tests and consults completed and resulted: yes  - Vital signs normal or at patient baseline:yes  - Tolerating oral intake to maintain hydration: No, pt is NPO   - Adequate pain control on oral analgesics: Pt denied pain currently  - Returns to baseline functional status: No   - Safe disposition plan has been identified: Pending   - GI consult completed with dispo plan: yes   Pt had fleet enema, but the return was only watery stool per CNA.  Pt hasn't produced a formed stool.    K 3.8 replaced via IV            Revision History

## 2019-11-16 NOTE — PROGRESS NOTES
NP-jesse tried digital stimulating manually to get the feces but unable to do it. Fleet enema given. pt ambulating in the hallway to help with bowel movement. Prior doing digital stimulating 1 mg Ativan given, during digital stimulation and enema pt keep crying and very emotiona, does not want to do either.pt guarding her rectum with her fingers and hand and holding it in. Pt does not want to sit in toilet. Will give schedule-miralax. Will continue to monitor.

## 2019-11-16 NOTE — PLAN OF CARE
-diagnostic tests and consults completed and resulted. No  -vital signs normal or at patient baseline. Yes  -tolerating oral intake to maintain hydration. Yes  -adequate pain control on oral analgesics. Yes  -returns to baseline functional status. No  -safe disposition plan has been identified. No  -GI consult completed with dispo plan. No  Patient reported abdominal pain. Received tylenol and Protonix.

## 2019-11-16 NOTE — PLAN OF CARE
1.diagnostic tests and consults completed and resulted-yes  2.vital signs normal or at patient baseline. Yes  3.tolerating oral intake to maintain hydration. Yes  4.adequate pain control on oral analgesics. Yes  5.returns to baseline functional status. No  6.safe disposition plan has been identified. No  7.GI consult completed with dispo plan. No  8.Patient reported abdominal pain- Received tylenol and Protonix.

## 2019-11-16 NOTE — PROGRESS NOTES
"[facility]  Daily Progress Note          Assessment & Plan:   Brianne Colunga is a 29 year old female with a PMH of Autism spectrum disorder, bipolar disorder, MDD, anxiety, IBS with constipation and diarrhea, chronic fecal incontinence who presents with vaginal bleeding and abdominal pain.      ##Abdominal Pain:   ##Chronic Fecal Incontinence:    ##Chronic Constipation   ##Chronic Encopresis:   Presented with lower abdominal pain and inability to urinate requiring straight cath in the ED.. Abdominal x-ray showed, \"Nonobstructive bowel gas pattern with moderate to large  amount of stool within the colon exam.\"  Seen by CRS in clinic on 6/20/19 and recommended that she start fiber supplement and Citrucel once a day and titrate up to TID. She was also referred to Pelvic floor PT for biofeedback training due to her fecal incontinence. She was referred to GI for slow transit constipation with upcoming appointment 11/25. During her psychiatry admission 7/26-7/30/19 she had issues with fecal soiling in the unit and the medical team was consulted and diagnosed with fecal impaction; placed on laxatives with good effect and with the relief of her bowel symptoms, her mental state was reported to have improved. She was referred to GI and CRS again at that time. VSS, afebrile. Hcg negative. Rectal exam was done in the ED. Per ED MD fvault full of stool but did not tolerate digital disimpaction or speculum exam. Admitted to observation for ongoing management of her constipation and stool impaction. Symptoms ikely due to fecal impaction. Leukocytosis could be due to dehydration/stress and less likely infection. She was treated with Golytely 2L x 1 fleet emema X 3.  Digital disimpaction attempted today with minimal obtained.  Patient refusing to  Sit on toilet.  She did walk around the unit, but appears to be holding stool in. CT A/P showed:  1. Distention of the rectosigmoid colon with " stool, with dilatation of  the rectum up to 9.5 cm, with associated rectal wall thickening and  perirectal inflammation suggesting stercoral colitis.  2. Mild bilateral hydronephrosis, which may be secondary to marked  distention of the bladder with urine/mass effect on the bladder by the  markedly distended rectum.   Discussed patient with medicine and UR.  UR recommending inpatient.  Called medicine and they would like colorectal surgery recommendations and will take the patient in the am if she is still impacted with stool.  - NPO  - MIVF with NS at 125ml/hr  - Protonix 40mg IV BID  -Continue miralax and citrucel  - colorectal surgery consult  -ambulate QID     ##Vaginal Bleeding  It is unclear the details of her vaginal bleed however per observation she has not had any bleeding in her diaper or on superficial visual exam as she declined and was unable to tolerate a speculum exam.  Due to significant anxiety, unable to  which prevented her from cooperating with a pelvic exam.   -Monitor  -out-patient follow-up         ##Leukocytosis: WBC 14.1 this and temp 99.9F.  CT shows stercoral colitis.    -Repeat CBC in am     ##Urinary Retention: inability to urinate requiring straight cath in the ED with 500 mL of dark urine. Now urinating without difficulty. UA with 15 RBC's and trace blood. Could be related to vaginal bleeding as above. No urinary retention today on bladder scan.  -Follow-up for repeat out-patient      ##Hypokalemia: K 3.7.  -monitor         ##Depression, Anxiety: Patient is obviously very anxious and frightened by her constipation and defecation. She is irritated with questions. Does not actively report SI.   - Assessment for depression and SI when feeling better  - If endorsing anxiety, depression or SI after constipation improved, consider Psychaitry      ##Insomnia:   - Continue with PTA melatonin     FEN: NPO  Lines: PIV  Prophylaxis: encourage ambulation          Consults:   colorectal          "Discharge Planning:   Pending stool passage        Interval History:   Brianne is having continued abdominal pain, shaking in pain, no vomiting.  She tried some food this am and pain got worse.    ROS:   Constitutional: No fevers/chills. NPO  Cardiovascular: No chest pain or palpitations.   Respiratory: No cough or SOB.   GI: +abdominal pain.      : No urinary complaints.   Musculoskeletal: Denies pain.           Physical Exam:   /84 (BP Location: Left arm)   Pulse 108   Temp 98.2  F (36.8  C)   Resp 16   Ht 1.626 m (5' 4\")   SpO2 97%   BMI 19.48 kg/m       GENERAL: Alert and oriented x 3. Anxious, shaking.   HEENT: Anicteric sclera. Mucous membranes moist.   CV: RRR. S1, S2. No murmurs appreciated.   RESPIRATORY: Effort normal. Lungs CTAB with no wheezing, rales, rhonchi.   GI: Abdomen slightly distended in lower quadrants with tenderness in lower quadrants.  rebound, guarding. Large stool palpated in the rectum.  NEUROLOGICAL: No focal deficits. Moves all extremities.    EXTREMITIES: No peripheral edema. Intact bilateral pedal pulses.   SKIN: No jaundice. No rashes.     Medication list reviewed.   Labs and imaging were reviewed.     Sofya Marcano, APRN, CNP  Ascom #[id number]      "

## 2019-11-16 NOTE — PLAN OF CARE
1.diagnostic tests and consults completed and resulted-no  2.vital signs normal or at patient baseline. No,  sepsis protocol lactic acid drawn  3.tolerating oral intake to maintain hydration-no, pt NPO  4.adequate pain control on oral analgesics. Yes, tylenol given for abdomen pain and fever  5.returns to baseline functional status. No  6.safe disposition plan has been identified. No  7.GI consult completed with dispo plan. No  8.Patient reported abdominal pain- Received tylenol and Protonix.    Pt having frequent loose stool and pt incontinence, changed brief several time.  Pt had shower   Pt went down for CT abdomen.  Pt has fleet enema ordered  Pt had low grade fever 99.9 F-Tylenol given, now 98.2F  Will continue to monitor.

## 2019-11-16 NOTE — CONSULTS
"Colon and Rectal Surgery Consultation Note  [facility]    Brianne Colunga MRN# [id number]   Age: 29 year old YOB: 1990     Date of Admission:  11/14/2019    Reason for consult: Constipation       Requesting physician: Sofya Marcano       Level of consult: Consult, follow and place orders           Assessment:   30 YO F w/ PMH of autism, bipolar d/o, depression, anxiety, IBS alternating between constipation and diarrhea, and fecal incontinence who was admitted on 11/14 for abdominal pain and fecal impaciton. CT AP shows dilated rectum and sigmoid colon filled with stool and associated rectal wall thickening without concern for perforation. Abdominal exam is benign, vitals are reassuring. Labs notable for leukocytosis of 14.1, potentially due to rectal inflammation/stercoral colitis.          Recommendations:   -No acute surgical intervention at this time  -Continue to trend WBC count  -Agree to GI recs regarding constipation, continue with enemas, suppositories, and manual disimpaction as able  -If no medical further options, not unreasonable to consider outpatient follow up with colorectal surgery to discuss possible colostomy formation in the future          History of Present Illness:   CC: Pascual Colunga is a 30 YO F w/ PMH of autism, bipolar d/o, depression, anxiety, IBS alternating between constipation and diarrhea, and fecal incontinence who was admitted on 11/14 for abdominal pain and fecal impaciton. She reports that she has struggled with this for years, since she was a child. Currently she reports lower abdominal discomfort. She had a bowel movement today and yesterday. She says that yesterday there was some blood mixed in with the stool. None today. She reports needing to strain. Stools are \"hard and sticky\". She also reports encoporesis. Patient stating that she would like a surgical intervention and wishes to have a colostomy. She was seen in CRS clinic " on 6/20/19 where fiber was recommend and she was referred for pelvic floor biofeedback training, psychology and GI. Since admission, she has received Golytely, fleet enemas and attempted digital disimpaction. She refuses to sit on the toilet and appears to be holding in stool per RNs and primary team notation. CT AP today showed mild hydroureteronephrosis 2/2 bladder distention, distention of the stool-filled rectum to 9.5 cm and sigmoid colon with rectal wall thickening suggestive of stercoral colitis. She currently reporting her baseline lower abdominal discomfort. Vitals are wnl. Labs notable for leukocytosis to 14.1, otherwise unremarkable.           Past Medical History:     Past Medical History:   Diagnosis Date     Acid reflux      Anxiety      Depressive disorder      Irritable bowel syndrome     with chronic constipation and fecal incontinence      Scoliosis              Past Surgical History:     Past Surgical History:   Procedure Laterality Date     COLONOSCOPY               Social History:     Social History     Socioeconomic History     Marital status: Single     Spouse name: Not on file     Number of children: Not on file     Years of education: Not on file     Highest education level: Not on file   Occupational History     Not on file   Social Needs     Financial resource strain: Not on file     Food insecurity:     Worry: Not on file     Inability: Not on file     Transportation needs:     Medical: Not on file     Non-medical: Not on file   Tobacco Use     Smoking status: Never Smoker     Smokeless tobacco: Never Used   Substance and Sexual Activity     Alcohol use: Never     Frequency: Never     Drug use: Never     Sexual activity: Not Currently     Partners: Male   Lifestyle     Physical activity:     Days per week: Not on file     Minutes per session: Not on file     Stress: Not on file   Relationships     Social connections:     Talks on phone: Not on file     Gets together: Not on file      "Attends Confucianist service: Not on file     Active member of club or organization: Not on file     Attends meetings of clubs or organizations: Not on file     Relationship status: Not on file     Intimate partner violence:     Fear of current or ex partner: Not on file     Emotionally abused: Not on file     Physically abused: Not on file     Forced sexual activity: Not on file   Other Topics Concern     Not on file   Social History Narrative     Not on file             Family History:     Family History   Problem Relation Age of Onset     Irritable Bowel Syndrome Mother      Depression Mother      Anxiety Disorder Mother      Autism Spectrum Disorder Mother      Heart Disease Father      Prostate Cancer Paternal Grandfather      Autism Spectrum Disorder Sister         Low functioning Autism, lives in a group home     Macular Degeneration No family hx of      Glaucoma No family hx of              Allergies:      Allergies   Allergen Reactions     Seasonal Allergies              Medications:   No current facility-administered medications on file prior to encounter.   aspirin-acetaminophen-caffeine (EXCEDRIN MIGRAINE) 250-250-65 MG tablet, Take 1 tablet by mouth every 6 hours as needed  melatonin 3 MG tablet, Take 3 mg by mouth  polyethylene glycol (MIRALAX/GLYCOLAX) powder, Take 17 g by mouth  traZODone (DESYREL) 50 MG tablet,               Review of Systems:   All other review of systems negative, except for what is mentioned above.        Physical Exam:   /83 (BP Location: Right arm)   Pulse 96   Temp 98.6  F (37  C) (Oral)   Resp 18   Ht 1.626 m (5' 4\")   SpO2 97%   BMI 19.48 kg/m    General: Alert, interactive, crying intermittently during exam  Resp: Unlabored breathing on RA  Cardiac: WWP  Abdomen: Soft, nondistended, mildly tender to palpation in lower abdomen. No rebound or guarding.  Anorectal: Poor visualization on exam due to patient discomfort. No palpable masses, soft brown stool on glove, no " blood. Evidence of stool leakage on underwear.   Extremities: No LE edema or obvious joint abnormalities  Skin: Warm and dry, no jaundice or rash  Neuro: A&Ox3, CN 2-12 intact, MARX            Data:     Results for orders placed or performed during the hospital encounter of 11/14/19 (from the past 24 hour(s))   Glucose by meter   Result Value Ref Range    Glucose 98 70 - 99 mg/dL   Comprehensive metabolic panel   Result Value Ref Range    Sodium 138 133 - 144 mmol/L    Potassium 3.7 3.4 - 5.3 mmol/L    Chloride 104 94 - 109 mmol/L    Carbon Dioxide 27 20 - 32 mmol/L    Anion Gap 6 3 - 14 mmol/L    Glucose 92 70 - 99 mg/dL    Urea Nitrogen 6 (L) 7 - 30 mg/dL    Creatinine 0.64 0.52 - 1.04 mg/dL    GFR Estimate >90 >60 mL/min/[1.73_m2]    GFR Estimate If Black >90 >60 mL/min/[1.73_m2]    Calcium 8.8 8.5 - 10.1 mg/dL    Bilirubin Total 0.8 0.2 - 1.3 mg/dL    Albumin 3.3 (L) 3.4 - 5.0 g/dL    Protein Total 7.2 6.8 - 8.8 g/dL    Alkaline Phosphatase 69 40 - 150 U/L    ALT 15 0 - 50 U/L    AST 5 0 - 45 U/L   CBC with platelets differential   Result Value Ref Range    WBC 14.1 (H) 4.0 - 11.0 10e9/L    RBC Count 3.88 3.8 - 5.2 10e12/L    Hemoglobin 12.3 11.7 - 15.7 g/dL    Hematocrit 36.4 35.0 - 47.0 %    MCV 94 78 - 100 fl    MCH 31.7 26.5 - 33.0 pg    MCHC 33.8 31.5 - 36.5 g/dL    RDW 11.0 10.0 - 15.0 %    Platelet Count 230 150 - 450 10e9/L    Diff Method Automated Method     % Neutrophils 75.8 %    % Lymphocytes 14.4 %    % Monocytes 7.5 %    % Eosinophils 1.6 %    % Basophils 0.4 %    % Immature Granulocytes 0.3 %    Nucleated RBCs 0 0 /100    Absolute Neutrophil 10.7 (H) 1.6 - 8.3 10e9/L    Absolute Lymphocytes 2.0 0.8 - 5.3 10e9/L    Absolute Monocytes 1.1 0.0 - 1.3 10e9/L    Absolute Eosinophils 0.2 0.0 - 0.7 10e9/L    Absolute Basophils 0.1 0.0 - 0.2 10e9/L    Abs Immature Granulocytes 0.0 0 - 0.4 10e9/L    Absolute Nucleated RBC 0.0    CT Abdomen Pelvis w Contrast    Narrative    EXAMINATION: CT ABDOMEN PELVIS W  CONTRAST, 11/16/2019 11:27 AM    TECHNIQUE:  Helical CT images of the abdomen and pelvis were obtained  with IV contrast. Contrast dose: 69ml isovue 370.    COMPARISON: Radiograph 11/14/2019.    PROVIDED HISTORY: abdominal pain, low grade fever and leukocytosis.    FINDINGS:  ABDOMEN/PELVIS:  HEPATOBILIARY: 3 mm hypodensity in segment 7 the liver, too small to  fully characterize by CT, possibly a small cyst or biliary hamartoma.  The gallbladder is unremarkable. No intrahepatic or extrahepatic  ductal dilatation.    PANCREAS: Pancreas is unremarkable. No pancreatic ductal dilatation.    SPLEEN: Unremarkable.    ADRENAL GLANDS: Unremarkable.    URINARY TRACT: Mild bilateral hydroureteronephrosis, which may be  secondary to the marked distention of the bladder or mass effect on  the bladder. Hyperattenuating material layering within the renal  collecting system suggesting excreted contrast.    REPRODUCTIVE ORGANS: Unremarkable.    GASTROINTESTINAL TRACT: Significant distention of the rectum with  stool and moderate distention of the sigmoid colon with stool. The  rectum measures 9.5 cm in diameter, with corresponding rectal wall  thickening, and perirectal inflammatory changes. The small bowel is  normal in caliber. The appendix is not discretely identified, however  no secondary signs of appendicitis in the right lower quadrant,  including no focal inflammatory changes.    PERITONEUM/FLUID: No focal fluid collection.    LYMPH NODES: Nonspecific mildly prominent inguinal lymph nodes  bilaterally. No enlarged lymph nodes within the abdomen or pelvis.    VESSELS: Normal caliber of the abdominal aorta. The portal venous  system and splenic vein are patent    LOWER THORAX: Lung bases are clear.    MUSCULOSKELETAL: Mild convex left curvature of the lumbar spine,  possibly exaggerated by patient position.      Impression    IMPRESSION:   1. Distention of the rectosigmoid colon with stool, with dilatation of  the rectum up  to 9.5 cm, with associated rectal wall thickening and  perirectal inflammation suggesting stercoral colitis.  2. Mild bilateral hydronephrosis, which may be secondary to marked  distention of the bladder with urine/mass effect on the bladder by the  markedly distended rectum.     I have personally reviewed the examination and initial interpretation  and I agree with the findings.    DEMARIO ZEPEDA MD   Lactic acid whole blood   Result Value Ref Range    Lactic Acid 0.8 0.7 - 2.0 mmol/L          Patient was discussed with colorectal surgery fellow, Dr. Islas, who will discuss with staff.     Narda Pierre MD  Surgery Resident, PGY2

## 2019-11-17 LAB
ALBUMIN SERPL-MCNC: 3.2 G/DL (ref 3.4–5)
ALP SERPL-CCNC: 64 U/L (ref 40–150)
ALT SERPL W P-5'-P-CCNC: 14 U/L (ref 0–50)
ANION GAP SERPL CALCULATED.3IONS-SCNC: 6 MMOL/L (ref 3–14)
AST SERPL W P-5'-P-CCNC: 9 U/L (ref 0–45)
BASOPHILS # BLD AUTO: 0.1 10E9/L (ref 0–0.2)
BASOPHILS NFR BLD AUTO: 0.4 %
BILIRUB SERPL-MCNC: 0.7 MG/DL (ref 0.2–1.3)
BUN SERPL-MCNC: 5 MG/DL (ref 7–30)
CALCIUM SERPL-MCNC: 8.7 MG/DL (ref 8.5–10.1)
CHLORIDE SERPL-SCNC: 106 MMOL/L (ref 94–109)
CO2 SERPL-SCNC: 26 MMOL/L (ref 20–32)
CREAT SERPL-MCNC: 0.57 MG/DL (ref 0.52–1.04)
DIFFERENTIAL METHOD BLD: ABNORMAL
EOSINOPHIL # BLD AUTO: 0.2 10E9/L (ref 0–0.7)
EOSINOPHIL NFR BLD AUTO: 1.8 %
ERYTHROCYTE [DISTWIDTH] IN BLOOD BY AUTOMATED COUNT: 11.1 % (ref 10–15)
GFR SERPL CREATININE-BSD FRML MDRD: >90 ML/MIN/{1.73_M2}
GLUCOSE SERPL-MCNC: 101 MG/DL (ref 70–99)
HCT VFR BLD AUTO: 35.2 % (ref 35–47)
HGB BLD-MCNC: 11.9 G/DL (ref 11.7–15.7)
IMM GRANULOCYTES # BLD: 0 10E9/L (ref 0–0.4)
IMM GRANULOCYTES NFR BLD: 0.3 %
LYMPHOCYTES # BLD AUTO: 1.8 10E9/L (ref 0.8–5.3)
LYMPHOCYTES NFR BLD AUTO: 15.1 %
MCH RBC QN AUTO: 31.6 PG (ref 26.5–33)
MCHC RBC AUTO-ENTMCNC: 33.8 G/DL (ref 31.5–36.5)
MCV RBC AUTO: 93 FL (ref 78–100)
MONOCYTES # BLD AUTO: 0.9 10E9/L (ref 0–1.3)
MONOCYTES NFR BLD AUTO: 7.2 %
NEUTROPHILS # BLD AUTO: 8.9 10E9/L (ref 1.6–8.3)
NEUTROPHILS NFR BLD AUTO: 75.2 %
NRBC # BLD AUTO: 0 10*3/UL
NRBC BLD AUTO-RTO: 0 /100
PLATELET # BLD AUTO: 245 10E9/L (ref 150–450)
POTASSIUM SERPL-SCNC: 3.5 MMOL/L (ref 3.4–5.3)
PROT SERPL-MCNC: 7.1 G/DL (ref 6.8–8.8)
RBC # BLD AUTO: 3.77 10E12/L (ref 3.8–5.2)
SODIUM SERPL-SCNC: 138 MMOL/L (ref 133–144)
WBC # BLD AUTO: 11.8 10E9/L (ref 4–11)

## 2019-11-17 PROCEDURE — 25800030 ZZH RX IP 258 OP 636: Performed by: PHYSICIAN ASSISTANT

## 2019-11-17 PROCEDURE — 96361 HYDRATE IV INFUSION ADD-ON: CPT

## 2019-11-17 PROCEDURE — 96376 TX/PRO/DX INJ SAME DRUG ADON: CPT

## 2019-11-17 PROCEDURE — 99214 OFFICE O/P EST MOD 30 MIN: CPT | Performed by: PSYCHIATRY & NEUROLOGY

## 2019-11-17 PROCEDURE — 25000132 ZZH RX MED GY IP 250 OP 250 PS 637: Performed by: PHYSICIAN ASSISTANT

## 2019-11-17 PROCEDURE — 36415 COLL VENOUS BLD VENIPUNCTURE: CPT | Performed by: NURSE PRACTITIONER

## 2019-11-17 PROCEDURE — 25000125 ZZHC RX 250: Performed by: PHYSICIAN ASSISTANT

## 2019-11-17 PROCEDURE — G0378 HOSPITAL OBSERVATION PER HR: HCPCS

## 2019-11-17 PROCEDURE — 80053 COMPREHEN METABOLIC PANEL: CPT | Performed by: NURSE PRACTITIONER

## 2019-11-17 PROCEDURE — 25000128 H RX IP 250 OP 636: Performed by: PHYSICIAN ASSISTANT

## 2019-11-17 PROCEDURE — 99232 SBSQ HOSP IP/OBS MODERATE 35: CPT | Performed by: INTERNAL MEDICINE

## 2019-11-17 PROCEDURE — C9113 INJ PANTOPRAZOLE SODIUM, VIA: HCPCS | Performed by: PHYSICIAN ASSISTANT

## 2019-11-17 PROCEDURE — 85025 COMPLETE CBC W/AUTO DIFF WBC: CPT | Performed by: NURSE PRACTITIONER

## 2019-11-17 PROCEDURE — 25000132 ZZH RX MED GY IP 250 OP 250 PS 637: Performed by: NURSE PRACTITIONER

## 2019-11-17 PROCEDURE — 99207 ZZC APP CREDIT; MD BILLING SHARED VISIT: CPT | Performed by: PHYSICIAN ASSISTANT

## 2019-11-17 RX ORDER — LIDOCAINE HYDROCHLORIDE 20 MG/ML
JELLY TOPICAL EVERY 4 HOURS PRN
Status: DISCONTINUED | OUTPATIENT
Start: 2019-11-17 | End: 2019-11-22 | Stop reason: HOSPADM

## 2019-11-17 RX ORDER — MIRTAZAPINE 15 MG/1
15 TABLET, FILM COATED ORAL AT BEDTIME
Status: DISCONTINUED | OUTPATIENT
Start: 2019-11-17 | End: 2019-11-22 | Stop reason: HOSPADM

## 2019-11-17 RX ORDER — LORAZEPAM 2 MG/ML
1 INJECTION INTRAMUSCULAR 2 TIMES DAILY PRN
Status: DISCONTINUED | OUTPATIENT
Start: 2019-11-17 | End: 2019-11-19

## 2019-11-17 RX ORDER — CYCLOBENZAPRINE HCL 5 MG
10 TABLET ORAL 3 TIMES DAILY PRN
Status: CANCELLED | OUTPATIENT
Start: 2019-11-17

## 2019-11-17 RX ORDER — LIDOCAINE 40 MG/G
CREAM TOPICAL
Status: CANCELLED | OUTPATIENT
Start: 2019-11-17

## 2019-11-17 RX ORDER — LORAZEPAM 1 MG/1
1 TABLET ORAL 2 TIMES DAILY
Status: DISCONTINUED | OUTPATIENT
Start: 2019-11-17 | End: 2019-11-22 | Stop reason: HOSPADM

## 2019-11-17 RX ORDER — LIDOCAINE HYDROCHLORIDE 20 MG/ML
JELLY TOPICAL ONCE
Status: CANCELLED | OUTPATIENT
Start: 2019-11-17 | End: 2019-11-17

## 2019-11-17 RX ORDER — LORAZEPAM 1 MG/1
1 TABLET ORAL ONCE
Status: COMPLETED | OUTPATIENT
Start: 2019-11-17 | End: 2019-11-17

## 2019-11-17 RX ADMIN — METHYLCELLULOSE 500 MG: 500 TABLET ORAL at 23:16

## 2019-11-17 RX ADMIN — PANTOPRAZOLE SODIUM 40 MG: 40 INJECTION, POWDER, FOR SOLUTION INTRAVENOUS at 23:15

## 2019-11-17 RX ADMIN — SODIUM CHLORIDE: 9 INJECTION, SOLUTION INTRAVENOUS at 01:37

## 2019-11-17 RX ADMIN — POLYETHYLENE GLYCOL 3350 17 G: 17 POWDER, FOR SOLUTION ORAL at 23:15

## 2019-11-17 RX ADMIN — MIRTAZAPINE 15 MG: 15 TABLET, FILM COATED ORAL at 23:16

## 2019-11-17 RX ADMIN — POLYETHYLENE GLYCOL 3350 17 G: 17 POWDER, FOR SOLUTION ORAL at 07:32

## 2019-11-17 RX ADMIN — MELATONIN TAB 3 MG 3 MG: 3 TAB at 23:16

## 2019-11-17 RX ADMIN — ACETAMINOPHEN 1000 MG: 500 TABLET, FILM COATED ORAL at 13:28

## 2019-11-17 RX ADMIN — LIDOCAINE HYDROCHLORIDE: 20 JELLY TOPICAL at 17:42

## 2019-11-17 RX ADMIN — METHYLCELLULOSE 500 MG: 500 TABLET ORAL at 13:28

## 2019-11-17 RX ADMIN — LORAZEPAM 1 MG: 1 TABLET ORAL at 19:29

## 2019-11-17 RX ADMIN — LORAZEPAM 1 MG: 1 TABLET ORAL at 15:59

## 2019-11-17 ASSESSMENT — PAIN DESCRIPTION - DESCRIPTORS
DESCRIPTORS: ACHING;CONSTANT
DESCRIPTORS: CRAMPING

## 2019-11-17 NOTE — PROVIDER NOTIFICATION
Spent 30 minutes with pt talking with her to take her suppository or enema. Agreeable to try suppository. Unable to get suppository in due to pt baring down. This was after a dose of ativan IV. NP notified.

## 2019-11-17 NOTE — PLAN OF CARE
Observation Goals:     1.diagnostic tests and consults completed and resulted-no  2.vital signs normal or at patient baseline. Tachycardiac.  3.tolerating oral intake to maintain hydration-no, pt NPO  4.adequate pain control on oral analgesics. Yes no c/o pain  5.returns to baseline functional status. No  6.safe disposition plan has been identified. No  7.GI consult completed with dispo plan. No  Sleeping soundly.
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
## 2019-11-17 NOTE — PLAN OF CARE
Observation Goals:     1.diagnostic tests and consults completed and resulted-no  2.vital signs normal or at patient baseline. No,  sepsis protocol lactic acid drawn  3.tolerating oral intake to maintain hydration-no, pt NPO  4.adequate pain control on oral analgesics. Yes, tylenol given for abdomen pain and fever  5.returns to baseline functional status. No  6.safe disposition plan has been identified. No  7.GI consult completed with dispo plan. No

## 2019-11-17 NOTE — PLAN OF CARE
Observation Goals:     1.diagnostic tests and consults completed and resulted-no  2.vital signs normal or at patient baseline. No,   3.tolerating oral intake to maintain hydration-no, pt NPO  4.adequate pain control on oral analgesics. Yes  5.returns to baseline functional status. No  6.safe disposition plan has been identified. No  7.GI consult completed with dispo plan. No    Pt. refused all constipation medication, provider aware.

## 2019-11-17 NOTE — PROGRESS NOTES
Colorectal Surgery Progress Note      Subjective:  Patient seen at bedside, discussed with her treatment options and encouraged to continue ME treatment as opposed to surgery at this time.     Vitals:  Vitals:    11/16/19 2016 11/16/19 2300 11/17/19 0315 11/17/19 0700   BP: 123/87 131/81 111/74 127/84   BP Location: Left arm Left arm Left arm    Pulse: 80 120 77 97   Resp: 18 18 16 16   Temp: 99  F (37.2  C) 98.6  F (37  C) 98.5  F (36.9  C) 98.2  F (36.8  C)   TempSrc: Oral Oral Oral Oral   SpO2: 96% 100% 100% 100%   Height:         I/O:  I/O last 3 completed shifts:  In: 3761.8 [P.O.:660; I.V.:1984.8; IV Piggyback:1117]  Out: -     Physical Exam:  Gen: AAOx3, NAD  Pulm: Non-labored breathing  Abd: Soft, non-distended, appropriately tender, no guarding  Ext:  Warm and well-perfused    BMP  Recent Labs   Lab 11/17/19  0659 11/16/19  0608 11/15/19  0453 11/14/19  1655    138 139 137   POTASSIUM 3.5 3.7 3.8 3.1*   CHLORIDE 106 104 108 100   CO2 26 27 25 32   BUN 5* 6* 11 17   CR 0.57 0.64 0.52 0.76   * 92 117* 146*   MAG  --  2.4*  --  2.7*   PHOS  --   --  2.3*  --      CBC  Recent Labs   Lab 11/17/19  0659 11/16/19  0608 11/15/19  1346 11/15/19  0453   WBC 11.8* 14.1* 14.7* 13.6*   HGB 11.9 12.3 11.9 11.3*   HCT 35.2 36.4 35.9 33.3*    230 236 225         ASSESSMENT: This is a 29 year old female w/ PMH of autism, bipolar d/o, depression, anxiety, IBS alternating between constipation and diarrhea, and fecal incontinence who was admitted on 11/14 for abdominal pain and fecal impaciton. CT AP shows dilated rectum and sigmoid colon filled with stool and associated rectal wall thickening without concern for perforation. Abdominal exam is benign, vitals are reassuring. Labs notable for leukocytosis of 14.1, potentially due to rectal inflammation/stercoral colitis.    - Continue bowel regimen from both above and below.   - Given rectal dilation seen on imaging, stool burden unlikely to improve from  treatment only from above. Continue to work with patient on tolerating [place] treatment    Patient was seen and discussed with Dr. Rosario Islas CRS fellow    Vee Castillo MD   General Surgery PGY1  [phone]    See consult noted dated 11/16/19  Continue bowel regimen. May need disimpaction under sedation if unsuccessful.    Wilmer Rees MD

## 2019-11-17 NOTE — PLAN OF CARE
Observation Goals:     1.diagnostic tests and consults completed and resulted-no  2.vital signs normal or at patient baseline. -120  3.tolerating oral intake to maintain hydration-no, pt NPO  4.adequate pain control on oral analgesics. Yes  5.returns to baseline functional status. No  6.safe disposition plan has been identified. No  7.GI consult completed with dispo plan. No  Continues to refuse all constipation meds.

## 2019-11-17 NOTE — PROGRESS NOTES
"Patient ID:  Brianne Colunga  MRN: [id number]  29 year old  YOB: 1990    Observation Admit Date: 11/14/2019    ED Admitting Attending: Alireza Bacon MD    Transfer Date and Time: November 17, 2019 at 1:29 PM     Transferring Observation Provider: Altagracia Vallejo PA-C    Admission Diagnoses:     1. Slow transit constipation    2. Urinary retention    3. Autism spectrum disorder        Transfer Diagnoses:  1.Chronic constipation  2.Urinary retention       Emergency Department and Observation Course:   Brianne Colunga is a 29 year old female with a PMH of Autism spectrum disorder, bipolar disorder, MDD, anxiety, IBS with constipation and diarrhea, chronic fecal incontinence who presents with vaginal bleeding and abdominal pain.       ##Abdominal Pain:   ##Chronic Fecal Incontinence:    ##Chronic Constipation   ##Chronic Encopresis:   Presented with lower abdominal pain and inability to urinate requiring straight cath in the ED.. Abdominal x-ray showed, \"Nonobstructive bowel gas pattern with moderate to large  amount of stool within the colon exam.\"  Seen by CRS in clinic on 6/20/19 and recommended that she start fiber supplement and Citrucel once a day and titrate up to TID. She was also referred to Pelvic floor PT for biofeedback training due to her fecal incontinence. She was referred to GI for slow transit constipation with upcoming appointment 11/25. During her psychiatry admission 7/26-7/30/19 she had issues with fecal soiling in the unit and the medical team was consulted and diagnosed with fecal impaction; placed on laxatives with good effect and with the relief of her bowel symptoms, her mental state was reported to have improved. She was referred to GI and CRS again at that time. VSS, afebrile. Hcg negative. Rectal exam was done in the ED. Per ED MD dejesusault full of stool but did not tolerate digital disimpaction or speculum exam. Admitted to observation for ongoing management of her constipation " and stool impaction. Symptoms ikely due to fecal impaction. Leukocytosis could be due to dehydration/stress and less likely infection. She was treated with Golytely 2L x 1 fleet emema X 3.  Digital disimpaction attempted today with minimal obtained.  Patient refusing to  Sit on toilet.  She did walk around the unit, but appears to be holding stool in. CT A/P showed:  1. Distention of the rectosigmoid colon with stool, with dilatation of  the rectum up to 9.5 cm, with associated rectal wall thickening and  perirectal inflammation suggesting stercoral colitis.  2. Mild bilateral hydronephrosis, which may be secondary to marked  distention of the bladder with urine/mass effect on the bladder by the  markedly distended rectum.   UR recommending inpatient.  Discussed case with medicine who agreed to admit her to inpatient due failing ED observation status at this point.         ##Vaginal Bleeding  It is unclear the details of her vaginal bleed however per observation she has not had any bleeding in her diaper or on superficial visual exam as she declined and was unable to tolerate a speculum exam.  Due to significant anxiety, unable to  which prevented her from cooperating with a pelvic exam.   -Monitor  -out-patient follow-up         ##Leukocytosis: WBC 14.1 this and temp 99.9F.  CT shows stercoral colitis.    -Repeat CBC in am     ##Urinary Retention: inability to urinate requiring straight cath in the ED with 500 mL of dark urine. Now urinating without difficulty. UA with 15 RBC's and trace blood. Could be related to vaginal bleeding as above. No urinary retention today on bladder scan.  -Follow-up for repeat out-patient      ##Hypokalemia: K 3.7.  -monitor         ##Depression, Anxiety: Patient is obviously very anxious and frightened by her constipation and defecation. She is irritated with questions. Does not actively report SI.   - Assessment for depression and SI when feeling better  - If endorsing anxiety,  "depression or SI after constipation improved, consider Psychaitry      ##Insomnia:   - Continue with PTA melatonin    At this time the patient has failed observation management due to failing ed observation status and having no improvement in her constipation and will be transferred to inpatient status.    Consults: Colorectal surgery, GI, Internal Medicine     DATA:    Transfer Exam:    /69 (BP Location: Right arm)   Pulse 97   Temp 98.1  F (36.7  C) (Oral)   Resp 18   Ht 1.626 m (5' 4\")   SpO2 95%   BMI 19.48 kg/m    Physical Exam   Constitutional: Pt is oriented to person, place, and time.Pt appears well-developed and well-nourished.   HENT:   Head: Normocephalic and atraumatic.   Eyes: Conjunctivae are normal. Pupils are equal, round, and reactive to light.   Neck: Normal range of motion. Neck supple.   Cardiovascular: Normal rate, regular rhythm, normal heart sounds and intact distal pulses.    Pulmonary/Chest: Effort normal and breath sounds normal. No respiratory distress. Pt has no wheezes. Pt has no rales  Abdominal: Soft. Bowel sounds are normal. Pt exhibits no distension and no mass. No tenderness. Pt has no rebound and no guarding.   Musculoskeletal: Normal range of motion. Pt exhibits no edema.   Neurological: Pt is alert and oriented to person, place, and time. Normal reflexes.   Skin: Skin is warm and dry. No rash noted.   Psychiatric: Pt has a normal mood and affect. Behavior is normal. Judgment and thought content normal.       Current Medications:    No current outpatient medications on file.       Medications Prior to Admission:    Medications Prior to Admission   Medication Sig Dispense Refill Last Dose     aspirin-acetaminophen-caffeine (EXCEDRIN MIGRAINE) 250-250-65 MG tablet Take 1 tablet by mouth every 6 hours as needed        melatonin 3 MG tablet Take 3 mg by mouth        polyethylene glycol (MIRALAX/GLYCOLAX) powder Take 17 g by mouth   Taking     traZODone (DESYREL) 50 MG " tablet    Taking       Significant Diagnostic Studies:     Results for orders placed or performed during the hospital encounter of 11/14/19   CBC with platelets differential     Status: Abnormal   Result Value Ref Range    WBC 13.4 (H) 4.0 - 11.0 10e9/L    RBC Count 4.26 3.8 - 5.2 10e12/L    Hemoglobin 13.6 11.7 - 15.7 g/dL    Hematocrit 39.7 35.0 - 47.0 %    MCV 93 78 - 100 fl    MCH 31.9 26.5 - 33.0 pg    MCHC 34.3 31.5 - 36.5 g/dL    RDW 11.2 10.0 - 15.0 %    Platelet Count 259 150 - 450 10e9/L    Diff Method Automated Method     % Neutrophils 70.7 %    % Lymphocytes 20.4 %    % Monocytes 7.5 %    % Eosinophils 0.7 %    % Basophils 0.3 %    % Immature Granulocytes 0.4 %    Nucleated RBCs 0 0 /100    Absolute Neutrophil 9.4 (H) 1.6 - 8.3 10e9/L    Absolute Lymphocytes 2.7 0.8 - 5.3 10e9/L    Absolute Monocytes 1.0 0.0 - 1.3 10e9/L    Absolute Eosinophils 0.1 0.0 - 0.7 10e9/L    Absolute Basophils 0.0 0.0 - 0.2 10e9/L    Abs Immature Granulocytes 0.1 0 - 0.4 10e9/L    Absolute Nucleated RBC 0.0    Basic metabolic panel     Status: Abnormal   Result Value Ref Range    Sodium 137 133 - 144 mmol/L    Potassium 3.1 (L) 3.4 - 5.3 mmol/L    Chloride 100 94 - 109 mmol/L    Carbon Dioxide 32 20 - 32 mmol/L    Anion Gap 5 3 - 14 mmol/L    Glucose 146 (H) 70 - 99 mg/dL    Urea Nitrogen 17 7 - 30 mg/dL    Creatinine 0.76 0.52 - 1.04 mg/dL    GFR Estimate >90 >60 mL/min/[1.73_m2]    GFR Estimate If Black >90 >60 mL/min/[1.73_m2]    Calcium 9.5 8.5 - 10.1 mg/dL   HCG qualitative pregnancy (blood)     Status: None   Result Value Ref Range    HCG Qualitative Serum Negative NEG^Negative   UA with Microscopic reflex to Culture     Status: Abnormal   Result Value Ref Range    Color Urine Yellow     Appearance Urine Clear     Glucose Urine Negative NEG^Negative mg/dL    Bilirubin Urine Negative NEG^Negative    Ketones Urine 10 (A) NEG^Negative mg/dL    Specific Gravity Urine 1.031 1.003 - 1.035    Blood Urine Trace (A) NEG^Negative     pH Urine 6.0 5.0 - 7.0 pH    Protein Albumin Urine 10 (A) NEG^Negative mg/dL    Urobilinogen mg/dL 4.0 (H) 0.0 - 2.0 mg/dL    Nitrite Urine Negative NEG^Negative    Leukocyte Esterase Urine Negative NEG^Negative    Source Catheterized Urine     WBC Urine 1 0 - 5 /HPF    RBC Urine 15 (H) 0 - 2 /HPF    Squamous Epithelial /HPF Urine <1 0 - 1 /HPF    Mucous Urine Present (A) NEG^Negative /LPF   Magnesium     Status: Abnormal   Result Value Ref Range    Magnesium 2.7 (H) 1.6 - 2.3 mg/dL   CBC with platelets differential     Status: Abnormal   Result Value Ref Range    WBC 13.6 (H) 4.0 - 11.0 10e9/L    RBC Count 3.49 (L) 3.8 - 5.2 10e12/L    Hemoglobin 11.3 (L) 11.7 - 15.7 g/dL    Hematocrit 33.3 (L) 35.0 - 47.0 %    MCV 95 78 - 100 fl    MCH 32.4 26.5 - 33.0 pg    MCHC 33.9 31.5 - 36.5 g/dL    RDW 11.2 10.0 - 15.0 %    Platelet Count 225 150 - 450 10e9/L    Diff Method Automated Method     % Neutrophils 79.7 %    % Lymphocytes 12.1 %    % Monocytes 6.8 %    % Eosinophils 0.6 %    % Basophils 0.4 %    % Immature Granulocytes 0.4 %    Nucleated RBCs 0 0 /100    Absolute Neutrophil 10.8 (H) 1.6 - 8.3 10e9/L    Absolute Lymphocytes 1.7 0.8 - 5.3 10e9/L    Absolute Monocytes 0.9 0.0 - 1.3 10e9/L    Absolute Eosinophils 0.1 0.0 - 0.7 10e9/L    Absolute Basophils 0.1 0.0 - 0.2 10e9/L    Abs Immature Granulocytes 0.1 0 - 0.4 10e9/L    Absolute Nucleated RBC 0.0    Comprehensive metabolic panel     Status: Abnormal   Result Value Ref Range    Sodium 139 133 - 144 mmol/L    Potassium 3.8 3.4 - 5.3 mmol/L    Chloride 108 94 - 109 mmol/L    Carbon Dioxide 25 20 - 32 mmol/L    Anion Gap 6 3 - 14 mmol/L    Glucose 117 (H) 70 - 99 mg/dL    Urea Nitrogen 11 7 - 30 mg/dL    Creatinine 0.52 0.52 - 1.04 mg/dL    GFR Estimate >90 >60 mL/min/[1.73_m2]    GFR Estimate If Black >90 >60 mL/min/[1.73_m2]    Calcium 7.6 (L) 8.5 - 10.1 mg/dL    Bilirubin Total 0.5 0.2 - 1.3 mg/dL    Albumin 3.2 (L) 3.4 - 5.0 g/dL    Protein Total 6.6 (L) 6.8 -  8.8 g/dL    Alkaline Phosphatase 55 40 - 150 U/L    ALT 15 0 - 50 U/L    AST 12 0 - 45 U/L   Phosphorus     Status: Abnormal   Result Value Ref Range    Phosphorus 2.3 (L) 2.5 - 4.5 mg/dL   CBC with platelets differential     Status: Abnormal   Result Value Ref Range    WBC 14.7 (H) 4.0 - 11.0 10e9/L    RBC Count 3.75 (L) 3.8 - 5.2 10e12/L    Hemoglobin 11.9 11.7 - 15.7 g/dL    Hematocrit 35.9 35.0 - 47.0 %    MCV 96 78 - 100 fl    MCH 31.7 26.5 - 33.0 pg    MCHC 33.1 31.5 - 36.5 g/dL    RDW 11.1 10.0 - 15.0 %    Platelet Count 236 150 - 450 10e9/L    Diff Method Automated Method     % Neutrophils 77.8 %    % Lymphocytes 14.3 %    % Monocytes 6.6 %    % Eosinophils 0.6 %    % Basophils 0.4 %    % Immature Granulocytes 0.3 %    Nucleated RBCs 0 0 /100    Absolute Neutrophil 11.4 (H) 1.6 - 8.3 10e9/L    Absolute Lymphocytes 2.1 0.8 - 5.3 10e9/L    Absolute Monocytes 1.0 0.0 - 1.3 10e9/L    Absolute Eosinophils 0.1 0.0 - 0.7 10e9/L    Absolute Basophils 0.1 0.0 - 0.2 10e9/L    Abs Immature Granulocytes 0.1 0 - 0.4 10e9/L    Absolute Nucleated RBC 0.0    Glucose by meter     Status: None   Result Value Ref Range    Glucose 98 70 - 99 mg/dL   Comprehensive metabolic panel     Status: Abnormal   Result Value Ref Range    Sodium 138 133 - 144 mmol/L    Potassium 3.7 3.4 - 5.3 mmol/L    Chloride 104 94 - 109 mmol/L    Carbon Dioxide 27 20 - 32 mmol/L    Anion Gap 6 3 - 14 mmol/L    Glucose 92 70 - 99 mg/dL    Urea Nitrogen 6 (L) 7 - 30 mg/dL    Creatinine 0.64 0.52 - 1.04 mg/dL    GFR Estimate >90 >60 mL/min/[1.73_m2]    GFR Estimate If Black >90 >60 mL/min/[1.73_m2]    Calcium 8.8 8.5 - 10.1 mg/dL    Bilirubin Total 0.8 0.2 - 1.3 mg/dL    Albumin 3.3 (L) 3.4 - 5.0 g/dL    Protein Total 7.2 6.8 - 8.8 g/dL    Alkaline Phosphatase 69 40 - 150 U/L    ALT 15 0 - 50 U/L    AST 5 0 - 45 U/L   CBC with platelets differential     Status: Abnormal   Result Value Ref Range    WBC 14.1 (H) 4.0 - 11.0 10e9/L    RBC Count 3.88 3.8 - 5.2  10e12/L    Hemoglobin 12.3 11.7 - 15.7 g/dL    Hematocrit 36.4 35.0 - 47.0 %    MCV 94 78 - 100 fl    MCH 31.7 26.5 - 33.0 pg    MCHC 33.8 31.5 - 36.5 g/dL    RDW 11.0 10.0 - 15.0 %    Platelet Count 230 150 - 450 10e9/L    Diff Method Automated Method     % Neutrophils 75.8 %    % Lymphocytes 14.4 %    % Monocytes 7.5 %    % Eosinophils 1.6 %    % Basophils 0.4 %    % Immature Granulocytes 0.3 %    Nucleated RBCs 0 0 /100    Absolute Neutrophil 10.7 (H) 1.6 - 8.3 10e9/L    Absolute Lymphocytes 2.0 0.8 - 5.3 10e9/L    Absolute Monocytes 1.1 0.0 - 1.3 10e9/L    Absolute Eosinophils 0.2 0.0 - 0.7 10e9/L    Absolute Basophils 0.1 0.0 - 0.2 10e9/L    Abs Immature Granulocytes 0.0 0 - 0.4 10e9/L    Absolute Nucleated RBC 0.0    Lactic acid whole blood     Status: None   Result Value Ref Range    Lactic Acid 0.8 0.7 - 2.0 mmol/L   Magnesium     Status: Abnormal   Result Value Ref Range    Magnesium 2.4 (H) 1.6 - 2.3 mg/dL   CBC with platelets differential     Status: Abnormal   Result Value Ref Range    WBC 11.8 (H) 4.0 - 11.0 10e9/L    RBC Count 3.77 (L) 3.8 - 5.2 10e12/L    Hemoglobin 11.9 11.7 - 15.7 g/dL    Hematocrit 35.2 35.0 - 47.0 %    MCV 93 78 - 100 fl    MCH 31.6 26.5 - 33.0 pg    MCHC 33.8 31.5 - 36.5 g/dL    RDW 11.1 10.0 - 15.0 %    Platelet Count 245 150 - 450 10e9/L    Diff Method Automated Method     % Neutrophils 75.2 %    % Lymphocytes 15.1 %    % Monocytes 7.2 %    % Eosinophils 1.8 %    % Basophils 0.4 %    % Immature Granulocytes 0.3 %    Nucleated RBCs 0 0 /100    Absolute Neutrophil 8.9 (H) 1.6 - 8.3 10e9/L    Absolute Lymphocytes 1.8 0.8 - 5.3 10e9/L    Absolute Monocytes 0.9 0.0 - 1.3 10e9/L    Absolute Eosinophils 0.2 0.0 - 0.7 10e9/L    Absolute Basophils 0.1 0.0 - 0.2 10e9/L    Abs Immature Granulocytes 0.0 0 - 0.4 10e9/L    Absolute Nucleated RBC 0.0    Comprehensive metabolic panel     Status: Abnormal   Result Value Ref Range    Sodium 138 133 - 144 mmol/L    Potassium 3.5 3.4 - 5.3 mmol/L     Chloride 106 94 - 109 mmol/L    Carbon Dioxide 26 20 - 32 mmol/L    Anion Gap 6 3 - 14 mmol/L    Glucose 101 (H) 70 - 99 mg/dL    Urea Nitrogen 5 (L) 7 - 30 mg/dL    Creatinine 0.57 0.52 - 1.04 mg/dL    GFR Estimate >90 >60 mL/min/[1.73_m2]    GFR Estimate If Black >90 >60 mL/min/[1.73_m2]    Calcium 8.7 8.5 - 10.1 mg/dL    Bilirubin Total 0.7 0.2 - 1.3 mg/dL    Albumin 3.2 (L) 3.4 - 5.0 g/dL    Protein Total 7.1 6.8 - 8.8 g/dL    Alkaline Phosphatase 64 40 - 150 U/L    ALT 14 0 - 50 U/L    AST 9 0 - 45 U/L       Signed:  Altagracia Vallejo PA-C  November 17, 2019 at 1:29 PM

## 2019-11-18 ENCOUNTER — APPOINTMENT (OUTPATIENT)
Dept: GENERAL RADIOLOGY | Facility: CLINIC | Age: 29
DRG: 389 | End: 2019-11-18
Attending: PHYSICIAN ASSISTANT
Payer: COMMERCIAL

## 2019-11-18 LAB
ANION GAP SERPL CALCULATED.3IONS-SCNC: 6 MMOL/L (ref 3–14)
BASOPHILS # BLD AUTO: 0 10E9/L (ref 0–0.2)
BASOPHILS NFR BLD AUTO: 0.4 %
BUN SERPL-MCNC: 13 MG/DL (ref 7–30)
CALCIUM SERPL-MCNC: 8.3 MG/DL (ref 8.5–10.1)
CHLORIDE SERPL-SCNC: 109 MMOL/L (ref 94–109)
CO2 SERPL-SCNC: 27 MMOL/L (ref 20–32)
CREAT SERPL-MCNC: 0.61 MG/DL (ref 0.52–1.04)
DIFFERENTIAL METHOD BLD: ABNORMAL
EOSINOPHIL # BLD AUTO: 0.2 10E9/L (ref 0–0.7)
EOSINOPHIL NFR BLD AUTO: 2.1 %
ERYTHROCYTE [DISTWIDTH] IN BLOOD BY AUTOMATED COUNT: 11.2 % (ref 10–15)
GFR SERPL CREATININE-BSD FRML MDRD: >90 ML/MIN/{1.73_M2}
GLUCOSE SERPL-MCNC: 96 MG/DL (ref 70–99)
HCT VFR BLD AUTO: 32 % (ref 35–47)
HGB BLD-MCNC: 10.6 G/DL (ref 11.7–15.7)
IMM GRANULOCYTES # BLD: 0 10E9/L (ref 0–0.4)
IMM GRANULOCYTES NFR BLD: 0.2 %
LYMPHOCYTES # BLD AUTO: 2.8 10E9/L (ref 0.8–5.3)
LYMPHOCYTES NFR BLD AUTO: 29.4 %
MCH RBC QN AUTO: 31.7 PG (ref 26.5–33)
MCHC RBC AUTO-ENTMCNC: 33.1 G/DL (ref 31.5–36.5)
MCV RBC AUTO: 96 FL (ref 78–100)
MONOCYTES # BLD AUTO: 0.9 10E9/L (ref 0–1.3)
MONOCYTES NFR BLD AUTO: 9.3 %
NEUTROPHILS # BLD AUTO: 5.5 10E9/L (ref 1.6–8.3)
NEUTROPHILS NFR BLD AUTO: 58.6 %
NRBC # BLD AUTO: 0 10*3/UL
NRBC BLD AUTO-RTO: 0 /100
PLATELET # BLD AUTO: 238 10E9/L (ref 150–450)
POTASSIUM SERPL-SCNC: 3.5 MMOL/L (ref 3.4–5.3)
RBC # BLD AUTO: 3.34 10E12/L (ref 3.8–5.2)
SODIUM SERPL-SCNC: 142 MMOL/L (ref 133–144)
WBC # BLD AUTO: 9.4 10E9/L (ref 4–11)

## 2019-11-18 PROCEDURE — 25000132 ZZH RX MED GY IP 250 OP 250 PS 637: Performed by: PHYSICIAN ASSISTANT

## 2019-11-18 PROCEDURE — 25000128 H RX IP 250 OP 636: Performed by: PHYSICIAN ASSISTANT

## 2019-11-18 PROCEDURE — 25000132 ZZH RX MED GY IP 250 OP 250 PS 637: Performed by: NURSE PRACTITIONER

## 2019-11-18 PROCEDURE — 74019 RADEX ABDOMEN 2 VIEWS: CPT

## 2019-11-18 PROCEDURE — 96376 TX/PRO/DX INJ SAME DRUG ADON: CPT

## 2019-11-18 PROCEDURE — 96361 HYDRATE IV INFUSION ADD-ON: CPT

## 2019-11-18 PROCEDURE — 80048 BASIC METABOLIC PNL TOTAL CA: CPT | Performed by: PHYSICIAN ASSISTANT

## 2019-11-18 PROCEDURE — 99232 SBSQ HOSP IP/OBS MODERATE 35: CPT | Performed by: INTERNAL MEDICINE

## 2019-11-18 PROCEDURE — 25800030 ZZH RX IP 258 OP 636: Performed by: PHYSICIAN ASSISTANT

## 2019-11-18 PROCEDURE — 12000001 ZZH R&B MED SURG/OB UMMC

## 2019-11-18 PROCEDURE — 85025 COMPLETE CBC W/AUTO DIFF WBC: CPT | Performed by: PHYSICIAN ASSISTANT

## 2019-11-18 PROCEDURE — G0378 HOSPITAL OBSERVATION PER HR: HCPCS

## 2019-11-18 PROCEDURE — 99207 ZZC APP CREDIT; MD BILLING SHARED VISIT: CPT | Performed by: PHYSICIAN ASSISTANT

## 2019-11-18 PROCEDURE — C9113 INJ PANTOPRAZOLE SODIUM, VIA: HCPCS | Performed by: PHYSICIAN ASSISTANT

## 2019-11-18 PROCEDURE — 36415 COLL VENOUS BLD VENIPUNCTURE: CPT | Performed by: PHYSICIAN ASSISTANT

## 2019-11-18 RX ORDER — BISACODYL 5 MG
10 TABLET, DELAYED RELEASE (ENTERIC COATED) ORAL ONCE
Status: COMPLETED | OUTPATIENT
Start: 2019-11-18 | End: 2019-11-18

## 2019-11-18 RX ORDER — BENZOCAINE/MENTHOL 6 MG-10 MG
LOZENGE MUCOUS MEMBRANE 2 TIMES DAILY
Status: DISCONTINUED | OUTPATIENT
Start: 2019-11-18 | End: 2019-11-22 | Stop reason: HOSPADM

## 2019-11-18 RX ORDER — PANTOPRAZOLE SODIUM 40 MG/1
40 TABLET, DELAYED RELEASE ORAL
Status: DISCONTINUED | OUTPATIENT
Start: 2019-11-19 | End: 2019-11-22 | Stop reason: HOSPADM

## 2019-11-18 RX ADMIN — MIRTAZAPINE 15 MG: 15 TABLET, FILM COATED ORAL at 21:08

## 2019-11-18 RX ADMIN — POLYETHYLENE GLYCOL 3350 17 G: 17 POWDER, FOR SOLUTION ORAL at 14:44

## 2019-11-18 RX ADMIN — POLYETHYLENE GLYCOL 3350 17 G: 17 POWDER, FOR SOLUTION ORAL at 07:42

## 2019-11-18 RX ADMIN — METHYLCELLULOSE 500 MG: 500 TABLET ORAL at 20:20

## 2019-11-18 RX ADMIN — POLYETHYLENE GLYCOL 3350 17 G: 17 POWDER, FOR SOLUTION ORAL at 20:20

## 2019-11-18 RX ADMIN — BISACODYL 10 MG: 5 TABLET, COATED ORAL at 15:43

## 2019-11-18 RX ADMIN — PANTOPRAZOLE SODIUM 40 MG: 40 INJECTION, POWDER, FOR SOLUTION INTRAVENOUS at 07:42

## 2019-11-18 RX ADMIN — LORAZEPAM 1 MG: 1 TABLET ORAL at 07:42

## 2019-11-18 RX ADMIN — LORAZEPAM 1 MG: 1 TABLET ORAL at 14:44

## 2019-11-18 RX ADMIN — SODIUM CHLORIDE: 9 INJECTION, SOLUTION INTRAVENOUS at 06:04

## 2019-11-18 RX ADMIN — METHYLCELLULOSE 500 MG: 500 TABLET ORAL at 07:42

## 2019-11-18 RX ADMIN — METHYLCELLULOSE 500 MG: 500 TABLET ORAL at 15:43

## 2019-11-18 ASSESSMENT — ACTIVITIES OF DAILY LIVING (ADL)
SWALLOWING: 0-->SWALLOWS FOODS/LIQUIDS WITHOUT DIFFICULTY
ADLS_ACUITY_SCORE: 12
ADLS_ACUITY_SCORE: 12
COGNITION: 0 - NO COGNITION ISSUES REPORTED
TOILETING: 0-->INDEPENDENT
BATHING: 0-->INDEPENDENT
DRESS: 0-->INDEPENDENT
FALL_HISTORY_WITHIN_LAST_SIX_MONTHS: NO
ADLS_ACUITY_SCORE: 12
ADLS_ACUITY_SCORE: 10
RETIRED_COMMUNICATION: 0-->UNDERSTANDS/COMMUNICATES WITHOUT DIFFICULTY
AMBULATION: 0-->INDEPENDENT
TRANSFERRING: 0-->INDEPENDENT
RETIRED_EATING: 0-->INDEPENDENT

## 2019-11-18 NOTE — PLAN OF CARE
Outpatient/Observation goals to be met before discharge home:     PRIMARY DIAGNOSIS: Constipation    Diagnostic tests and consults completed and resulted: In progress  Vital signs normal or at patient baseline: Yes  Tolerating oral intake to maintain hydration: YES-pt also receiving IV hydration.  Adequate pain control on oral analgesics: YES-pt had BM and pain is doing better with PO meds.  Returns to baseline functional status: YES-pt independent with activity.  Safe disposition plan has been identified: No-yet to be determined.  GI consult completed with dispo plan: No

## 2019-11-18 NOTE — PLAN OF CARE
Outpatient/Observation goals to be met before discharge home:    PRIMARY DIAGNOSIS: Consitpation  OUTPATIENT/OBSERVATION GOALS TO BE MET BEFORE DISCHARGE:  Diagnostic tests and consults completed and resulted: NO-labs ordered in AM for monitoring.  Vital signs normal or at patient baseline: YES-vitals WNL.  Tolerating oral intake to maintain hydration: YES-pt also receiving IV hydration.  Adequate pain control on oral analgesics: NO-pt having pain in rectum area but narcotics are not being ordered at this time due to contraindicated for constipation.  Returns to baseline functional status: YES-pt independent with activity.  Safe disposition plan has been identified: NO-yet to be determined.  GI consult completed with dispo plan: NO-see notes for recs.  *Nurse to notify MD when observation goals have been met and patient is ready for discharge.

## 2019-11-18 NOTE — PLAN OF CARE
"    A&O. VSS. Independent, ambulated in room. Tolerating diet. Pt on bowel regimen. Had one small BM on shift. Voided. Pt currently has no IV access because pt refused PIV to be placed. Provider is aware that pt has no PIV or no IV fluids going at this time. Per provider ok with it as pt is tolerating PO fluids. Pt will be staying another night and continuing with the current bowel regimen.       BP 95/48 (BP Location: Right arm)   Pulse 65   Temp 98.3  F (36.8  C) (Oral)   Resp 18   Ht 1.626 m (5' 4\")   SpO2 98%   BMI 19.48 kg/m      "

## 2019-11-18 NOTE — PLAN OF CARE
Outpatient/Observation goals to be met before discharge home:    PRIMARY DIAGNOSIS: Constipation  OUTPATIENT/OBSERVATION GOALS TO BE MET BEFORE DISCHARGE:  Diagnostic tests and consults completed and resulted: NO-labs ordered in AM for monitoring.  Vital signs normal or at patient baseline: YES-vitals WNL.  Tolerating oral intake to maintain hydration: YES-pt also receiving IV hydration.  Adequate pain control on oral analgesics: NO-pt having pain in rectum area but narcotics are not being ordered at this time due to contraindicated for constipation.  Returns to baseline functional status: YES-pt independent with activity.  Safe disposition plan has been identified: NO-yet to be determined.  GI consult completed with dispo plan: NO-see notes for recs.  *Nurse to notify MD when observation goals have been met and patient is ready for discharge.

## 2019-11-18 NOTE — PROVIDER NOTIFICATION
Text paged on call Juventino in regards to: Pt on 6D in room 6501 is still anxious and having pain; was wondering if the muscle relaxer was going to be ordered for her?

## 2019-11-18 NOTE — PROGRESS NOTES
Care Coordinator - Discharge Planning    Admission Date/Time:  11/14/2019  Attending MD:  Neelam att. providers found     Data  Chart reviewed, discussed with interdisciplinary team.   Patient was admitted for:   1. Slow transit constipation    2. Urinary retention    3. Autism spectrum disorder         Assessment   Concerns with insurance coverage for discharge needs: None.  Current Living Situation: Patient lives in a group home; she is her own decision maker.      [facility] [address] [place] [address].    Gabrielle  Supervisor [phone]  Fax [phone]  Support System: Supportive  Services Involved: [facility] [place] [facility]  - Karen (Ph: [phone])  Transportation at Discharge: TBD  Transportation to Medical Appointments:  - Name of caregiver: Self/ Staff  Barriers to Discharge: Medical stability       Coordination of Care   Tried to contact alf to confirm patient may return today and ask if they are transporting; no answer at all available phone numbers: [phone], [phone],  [phone], [phone], [phone].    Will try to call again later.        Plan  Anticipated Discharge Date: Today  Anticipated Discharge Plan:  Return to       Dayna Castillo RN, BSN, PHN  Internal Medicine Care Coordinator  [facility]  Desk Phone: [phone]  Pager: [phone]    To contact Weekend [facility], dial [phone] and enter job code [phone] at prompt.   This pager can not be contacted by text page or outside line.

## 2019-11-18 NOTE — PROGRESS NOTES
Colorectal Surgery Progress Note      Subjective:  Pt had a large bowel movement overnight and abdominal pain has now resolved.      Vitals:  Vitals:    11/17/19 1117 11/17/19 1409 11/17/19 2326 11/18/19 0605   BP: 121/69 124/77 96/70 104/65   BP Location: Right arm Right arm Left arm Left arm   Pulse:   61 65   Resp: 18 16 18 16   Temp: 98.1  F (36.7  C) 98.1  F (36.7  C) 98  F (36.7  C) 98.2  F (36.8  C)   TempSrc: Oral Oral Oral Oral   SpO2: 95% 99% 98% 100%   Height:         I/O:  No intake/output data recorded.    Physical Exam:  Gen: AAOx3, NAD  Pulm: Non-labored breathing  Abd: Soft, non-distended, non-tender.  Able to palpate some stool on left side. No rebound/guarding.   Ext:  Warm and well-perfused    BMP  Recent Labs   Lab 11/18/19  0602 11/17/19  0659 11/16/19  0608 11/15/19  0453 11/14/19  1655    138 138 139 137   POTASSIUM 3.5 3.5 3.7 3.8 3.1*   CHLORIDE 109 106 104 108 100   CO2 PENDING 26 27 25 32   BUN PENDING 5* 6* 11 17   CR PENDING 0.57 0.64 0.52 0.76   GLC PENDING 101* 92 117* 146*   MAG  --   --  2.4*  --  2.7*   PHOS  --   --   --  2.3*  --      CBC  Recent Labs   Lab 11/18/19  0602 11/17/19  0659 11/16/19  0608 11/15/19  1346   WBC 9.4 11.8* 14.1* 14.7*   HGB 10.6* 11.9 12.3 11.9   HCT 32.0* 35.2 36.4 35.9    245 230 236         ASSESSMENT: This is a 29 year old female with PMH autism, bipolar d/o, depression, anxiety, and IBS alternating between constipation and diarrhea and fecal incontinence.  Admitted 11/14 for abdominal pain and fecal impaction.  CT shows dilated rectum and sigmoid colon filled with stool & associated rectal wall thickening 2/2 inflammation/sterocoral colitis without concern for perforation.  Was previously seen in Colorectal Clinic previously with recommendations of fiber and Pelvic Floor evaluation and biofeedback training. Now passed large amount of stool overnight.     - recommend continued bowel regimen from above and below with miralax,  suppositories, prn enemas.  Encouraged good hydration.   - pt should follow up with CRS in 3-4 weeks  - we will sign off at this time.  Please call/page with questions/concerns.       Nell Quiros PA-C   Colon and Rectal Surgery  [id number]     Patient was seen and discussed with Fellow, Dr. Padilla

## 2019-11-18 NOTE — PLAN OF CARE
Outpatient/Observation goals to be met before discharge home:    PRIMARY DIAGNOSIS: Constipation  OUTPATIENT/OBSERVATION GOALS TO BE MET BEFORE DISCHARGE:  Diagnostic tests and consults completed and resulted: NO-labs ordered in AM for monitoring.  Vital signs normal or at patient baseline: YES-vitals WNL.  Tolerating oral intake to maintain hydration: YES-pt also receiving IV hydration.  Adequate pain control on oral analgesics: YES-pt had BM and pain is doing better with PO meds.  Returns to baseline functional status: YES-pt independent with activity.  Safe disposition plan has been identified: NO-yet to be determined.  GI consult completed with dispo plan: NO-see notes for recs.  *Nurse to notify MD when observation goals have been met and patient is ready for discharge.

## 2019-11-18 NOTE — PROGRESS NOTES
GASTROENTEROLOGY PROGRESS NOTE       Patient Summary   Brianne Colunga is a 29 year old female with a past medical history of Autism spectrum disorder, bipolar disorder, MDD, anxiety, chronic constipation (slow transit, based on Sitz Marker study), and chronic fecal incontinence who presents with abdominal pain in the setting of constipation.        ASSESSMENT AND RECOMMENDATIONS:   #Constipation, chronic  #Abdominal pain  Patient with a significant stool burden on abdominal x-ray (moderate to large), along with significant stool burden in the rectum noted on rectal exam. Patient with chronic constipation, likely 2/2 slow transit constipation based on previous Sitz Marker study. Good rectal tone on rectal exam, but large firm stool felt. Colonoscopy in 2016 without abnormalities, but unable to review colonoscopy. No electrolyte abnormalities, no medications/narcotics likely contributing, TSH wnl, and no immobility.    After aggressive bowel regimen and multiple attempts of mechanical fecal disimpaction, patient finally passed large amount of formed stool last night. Plan is to obtain abdominal x-ray to confirm obstipation resolution. If negative, plan is to advance diet slowly and continue with aggressive bowel regimen.     Recommendations  --Abdominal x-ray to confirm resolution of obstipation   --Clear liquid and advance diet very slowly   --Optimize electrolytes (Mg 2, K 4, and Ca 8)  --Ambulate patient frequently  --Miralax TID and can be increased if patient not having stool   --Encourage fluid intake   --Outpatient GI follow up for chronic constipation and colorectal surgery for pelvic floor dysfunction evaluation and management ([email or web address])  --GI will follow    Pt care plan discussed with Dr. Martin, GI staff physician. Thank you for involving us in this patient's care. Please do not hesitate to contact the GI service with any questions or concerns.    YAS Barnes  "[facility]  Department of Gastroenterology   P: [phone]  Subjective\events within the 24 hours:   Patient's abdominal pain has significantly improved after she passed a large solid stool in last evening.     4 point ROS performed and negative unless noted above.           Medications:     Current Facility-Administered Medications   Medication     acetaminophen (TYLENOL) tablet 1,000 mg     bisacodyl (DULCOLAX) EC tablet 10 mg     hydrocortisone (CORTAID) 1 % cream     lidocaine (XYLOCAINE) 2 % external gel     LORazepam (ATIVAN) injection 1 mg     LORazepam (ATIVAN) tablet 1 mg     magnesium citrate solution 296 mL     magnesium sulfate 2 g in NS intermittent infusion (PharMEDium or [facility] C[facility]d)     magnesium sulfate 4 g in 100 mL sterile water (premade)     melatonin tablet 3 mg     methylcellulose (CITRUCEL) tablet 500 mg     mirtazapine (REMERON) tablet 15 mg     naloxone (NARCAN) injection 0.1-0.4 mg     ondansetron (ZOFRAN-ODT) ODT tab 4 mg    Or     ondansetron (ZOFRAN) injection 4 mg     [START ON 11/19/2019] pantoprazole (PROTONIX) EC tablet 40 mg     polyethylene glycol (MIRALAX/GLYCOLAX) Packet 17 g     potassium chloride (KLOR-CON) Packet 20-40 mEq     potassium chloride 10 mEq in 100 mL intermittent infusion with 10 mg lidocaine     potassium chloride 10 mEq in 100 mL sterile water intermittent infusion (premix)     potassium chloride 20 mEq in 50 mL intermittent infusion     potassium chloride ER (K-DUR/KLOR-CON M) CR tablet 20-40 mEq        Physical Exam   Blood pressure 95/48, pulse 65, temperature 98.3  F (36.8  C), temperature source Oral, resp. rate 18, height 1.626 m (5' 4\"), SpO2 98 %.    Constitutional: , no acute distress  Eyes: Sclera anicteric/injected  HEENT: Normal oropharynx without ulcers or exudate, mucus membranes moist  CV: RRR, no m/r/g  Respiratory: CTAB  Abd: Mild distension, hypoactive BS, mild TTP throughout, no rebound or guarding   Rectal: Firm stool felt on initial entry into rectal " vault  Skin: warm, perfused  Neuro: AAO x 3    Data   Current Labs  CBC  Recent Labs   Lab 11/18/19  0602 11/17/19  0659 11/16/19  0608 11/15/19  1346   WBC 9.4 11.8* 14.1* 14.7*   RBC 3.34* 3.77* 3.88 3.75*   HGB 10.6* 11.9 12.3 11.9   HCT 32.0* 35.2 36.4 35.9   MCV 96 93 94 96   MCH 31.7 31.6 31.7 31.7   MCHC 33.1 33.8 33.8 33.1   RDW 11.2 11.1 11.0 11.1    245 230 236     BMP  Recent Labs   Lab 11/18/19  0602 11/17/19  0659 11/16/19  0608 11/15/19  0453 11/14/19  1655    138 138 139 137   POTASSIUM 3.5 3.5 3.7 3.8 3.1*   CHLORIDE 109 106 104 108 100   CO2 27 26 27 25 32   ANIONGAP 6 6 6 6 5   GLC 96 101* 92 117* 146*   BUN 13 5* 6* 11 17   CR 0.61 0.57 0.64 0.52 0.76   GFRESTIMATED >90 >90 >90 >90 >90   GFRESTBLACK >90 >90 >90 >90 >90   BOB 8.3* 8.7 8.8 7.6* 9.5   MAG  --   --  2.4*  --  2.7*   PHOS  --   --   --  2.3*  --       INRNo lab results found in last 7 days.  Liver panel  Recent Labs   Lab 11/17/19 0659 11/16/19  0608 11/15/19  0453   PROTTOTAL 7.1 7.2 6.6*   ALBUMIN 3.2* 3.3* 3.2*   BILITOTAL 0.7 0.8 0.5   ALKPHOS 64 69 55   AST 9 5 12   ALT 14 15 15          HPI:   Brianne Colunga is a 29 year old female with a PMHX significant for Autism spectrum disorder, bipolar disorder, MDD, anxiety, chronic constipation (slow transit, based on Sitz Marker study), and chronic fecal incontinence who presents with abdominal pain in the setting of constipation.      Prior to admission the patient reports that for the day prior to admission she had lower abdominal cramping. She has been having ongoing issues with constipation for years. She sits on the toilet for 60 minutes at a time, strains with bowel movements and has 2 bowel movements per week. Denies any nausea or emesis. She states she is scared of having bowel movements because when she gets this constipated it hurts. She has been trying to take citrucel TID, but hasn't gotten down a bowel regimen fully. In the setting of constipation, she also  has issues with incontinence of stool and urine and difficulty initiating her urinary stream. Denies fevers or chills.      Of note, she has had chronic constipation since at least 2015. She has undergone a colonoscopy in 2016, which was normal per report. She had a Sitz Marker study which showed slow colonic transit. She was supposed to undergo further pelvic floor PT and testing, but has not done so yet. She used to live in [place] (where all her sibling and parents live), but now she lives with a roommate in [place] (moved her for theater and to be in a big city) and lives in a group home. She has not established care with a GI provider in [place]. Of note, she was seen by CRS in the clinic on 6/20/19. It was recommended that she start fiber supplement and Citrucel once a day and titrate up to TID. She was also referred to Pelvic floor PT for biofeedback training due to her fecal incontinence.
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
## 2019-11-18 NOTE — CONSULTS
[facility], [place]  Psychiatry Consultation      Patient name: Brianne Colunga   MRN: [id number]    Age: 29 year old    YOB: 1990    Identifying information:   The patient is a 29 year old  female who is currently admitted to the medical service on unit 6D    Reason for consult: Assist in managing anxiety    History of present illness:   The patient has a history of autism spectrum disorder, major depressive disorder, and anxiety who is admitted to the hospital for management of abdominal pain and constipation.  Imaging during her hospital course revealed a large amount of stool in her colon with the patient being resistant to many attempts to help facilitate a bowel movement.  Her primary treatment team suspects that anxiety may be complicating her treatment course for which psychiatry consultation is being requested.  On examination today, the patient was quick to tell me that her belly hurts and that she has been feeling quite anxious about the need to have a bowel movement.  Her anxiety is also been heightened due to the general hospital environment.  Otherwise, she reports that her mood is fair given her current circumstance without endorsing significant sadness or suicidal ideation.  Other than issues related to her current hospitalization, she denied other contributing psychosocial stressors.  She reports tolerating her medications well and reports having adequate community supports.  Primary treatment goal today is to better manage her anxiety in hopes of having a bowel movement soon to alleviate her abdominal pain.  The mere mention of antidepressants or SSRIs seems to make her quite nervous as she recalls prior trials of this medication class that did not go well and worsened her anxiety.     Psychiatric Review of Systems:    -- Depressive episode: Reports that her mood is slightly sad in the context of her abdominal pain and constipation.  Otherwise,  she denied associated vegetative symptoms.  She denies feeling helpless or hopeless.  She denied suicidal and homicidal thoughts.  -- Rosa:  denies symptoms  -- Psychosis:  denies symptoms  -- Anxiety: She endorsed symptoms corresponding to SHEA; no significant panic attacks reported.    -- PTSD: denies symptoms  -- OCD: denies  symptoms  -- Eating disorder: denies symptoms    Psychiatric History:    The patient recalls being diagnosed with an autism spectrum disorder in high school.  She also recalls the onset of mood and anxiety symptoms around that time for which she pursued outpatient mental health treatment.   Multiple prior inpatient hospitalizations reported for depressed mood and suicidal ideation.  She denied prior suicide attempts.  No history of involuntary commitment.  No history of self-injurious behavior.  No history of ECT.  Past medication trials have included Prozac, Lexapro, mirtazapine, trazodone, gabapentin, and Xanax.  Her outpatient care is managed through  behavioral health where she sees Dr. Ortiz.    Substance Use History:    Denies using illicit substances or alcohol corresponding to a diagnosis of abuse or dependence. No prior chemical dependency treatments reported.    Medical History:  Refer to the internal medicine notes which I reviewed.       Current Facility-Administered Medications:      acetaminophen (TYLENOL) tablet 1,000 mg, 1,000 mg, Oral, Q6H PRN, Yobany Frazier PA, 1,000 mg at 11/17/19 1328     bisacodyl (DULCOLAX) EC tablet 10 mg, 10 mg, Oral, Daily PRN, Sofya Marcano APRN CNP     glycerin (ADULT) Suppository 1 suppository, 1 suppository, Rectal, Daily, Sofya Marcano APRN CNP     lidocaine (XYLOCAINE) 2 % external gel, , Topical, Q4H PRN, Paloma Stern PA     LORazepam (ATIVAN) injection 1 mg, 1 mg, Intravenous, BID PRN, Paloma Stern PA     LORazepam (ATIVAN) tablet 1 mg, 1 mg, Oral, BID, Paloma Stern PA, 1 mg at 11/17/19 1589     magnesium  citrate solution 296 mL, 296 mL, Oral, Once, Sofya Marcano APRN CNP     magnesium sulfate 2 g in NS intermittent infusion ([facility]arMEDium or [facility] C[facility]), 2 g, Intravenous, Daily PRN, To Murdock APRN CNP     magnesium sulfate 4 g in 100 mL sterile water (premade), 4 g, Intravenous, Q4H PRN, To Murdock APRN CNP     melatonin tablet 3 mg, 3 mg, Oral, At Bedtime, Yobany Frazier PA, 3 mg at 11/17/19 2316     methylcellulose (CITRUCEL) tablet 500 mg, 500 mg, Oral, TID, Yobany Frazier PA, 500 mg at 11/17/19 2316     mirtazapine (REMERON) tablet 15 mg, 15 mg, Oral, At Bedtime, Paloma Stern PA, 15 mg at 11/17/19 2316     naloxone (NARCAN) injection 0.1-0.4 mg, 0.1-0.4 mg, Intravenous, Q2 Min PRN, Yobany Frazier PA     ondansetron (ZOFRAN-ODT) ODT tab 4 mg, 4 mg, Oral, Q6H PRN **OR** ondansetron (ZOFRAN) injection 4 mg, 4 mg, Intravenous, Q6H PRN, Yobany Frazier PA     pantoprazole (PROTONIX) 40 mg IV push injection, 40 mg, Intravenous, BID, Yobany Frazier PA, 40 mg at 11/17/19 2315     pink lady enema (COMPOUNDED: docusate, magnesium citrate, mineral oil, sodium phosphate), 286 mL, Rectal, Daily, Paloma Stern PA, Stopped at 11/17/19 1555     pink lady enema (COMPOUNDED: docusate, magnesium citrate, mineral oil, sodium phosphate), 286 mL, Rectal, Once, Sofya Marcano APRN CNP     polyethylene glycol (MIRALAX/GLYCOLAX) Packet 17 g, 17 g, Oral, TID, Aixa Rowland APRN CNP, 17 g at 11/17/19 2315     potassium chloride (KLOR-CON) Packet 20-40 mEq, 20-40 mEq, Oral or Feeding Tube, Q2H PRN, Yobany Frazier PA     potassium chloride 10 mEq in 100 mL intermittent infusion with 10 mg lidocaine, 10 mEq, Intravenous, Q1H PRN, Yobany Frazier PA, 10 mEq at 11/15/19 0914     potassium chloride 10 mEq in 100 mL sterile water intermittent infusion (premix), 10 mEq, Intravenous, Q1H PRN, Yobany Frazier PA     potassium chloride 20 mEq  "in 50 mL intermittent infusion, 20 mEq, Intravenous, Q1H PRN, Yobany Frazier PA     potassium chloride ER (K-DUR/KLOR-CON M) CR tablet 20-40 mEq, 20-40 mEq, Oral, Q2H PRN, Yobany Frazier PA, 20 mEq at 11/16/19 0830     sodium chloride 0.9% infusion, , Intravenous, Continuous, Paloma Stern PA, Last Rate: 100 mL/hr at 11/17/19 2330     Social History:  Refer to the psychosocial assessment completed by the .     Family History:    Notable for depression, anxiety, and autism spectrum disorder.  No knowledge of suicides or bipolar disorder in the family.    Vital Signs:    B/P: 96/70, T: 98, P: 61, R: 18  Estimated body mass index is 19.48 kg/m  as calculated from the following:    Height as of this encounter: 1.626 m (5' 4\").    Weight as of 8/14/19: 51.5 kg (113 lb 8 oz).       Mental status examination:  Appearance: Laying in bed, holding her abdomen, initially complaining of pain and shaking however her presentation improved as the interview progressed, appearing more comfortable.  Attitude:  Attempts to be cooperative  Eye Contact: Poor  Mood: Anxious  Affect: Mood congruent  Speech: She talked in a soft voice.  Not pushed or pressured.  Psychomotor Behavior: She would occasionally shake her legs in an anxious fashion.  Thought Process: Linear and logical; not tangential or circumstantial or disorganized  Associations:  Logical; no loose associations Noted  Thought Content:  No obvious paranoia, delusions, ideas of reference, or grandiosity noted. Denies auditory or visual hallucinations. Denies suicidal Ideations. Denies homicidal ideations.  Insight:  Fair  Judgment:  Fair  Oriented to:  time, person, and place  Attention Span and Concentration:  Intact  Recent and Remote Memory: Intact based on interviewing and details provided  Language: Appropriate based on interviewing  Fund of Knowledge: Appropriate based on interviewing  Muscle Strength and Tone: Normal upon visual " inspection  Gait and Station: Not assessed          Diagnoses:    Unspecified anxiety disorder (rule out a generalized anxiety disorder)  Autism spectrum disorder  History of major depressive disorder  History of ADHD     Recommendations:  Noting that the patient's anxiety has been complicating her treatment course, I recommend temporarily scheduling Ativan at a dose of 1 mg in the morning and 1 mg in the afternoon while utilizing an as needed dose of Ativan prior to procedures intended on alleviating her constipation.  Closely monitor for excessive sedation or respiratory suppression when using benzodiazepines however records indicate she has tolerated the 1 mg dose fairly well during her hospital course.    Add mirtazapine 15 mg at bedtime to further address her anxiety and insomnia.    Please reconsult with psychiatry as needed.

## 2019-11-19 ENCOUNTER — APPOINTMENT (OUTPATIENT)
Dept: GENERAL RADIOLOGY | Facility: CLINIC | Age: 29
DRG: 389 | End: 2019-11-19
Attending: PHYSICIAN ASSISTANT
Payer: COMMERCIAL

## 2019-11-19 LAB
ANION GAP SERPL CALCULATED.3IONS-SCNC: 3 MMOL/L (ref 3–14)
BASOPHILS # BLD AUTO: 0 10E9/L (ref 0–0.2)
BASOPHILS NFR BLD AUTO: 0.7 %
BUN SERPL-MCNC: 13 MG/DL (ref 7–30)
CALCIUM SERPL-MCNC: 8.8 MG/DL (ref 8.5–10.1)
CHLORIDE SERPL-SCNC: 104 MMOL/L (ref 94–109)
CO2 SERPL-SCNC: 31 MMOL/L (ref 20–32)
CREAT SERPL-MCNC: 0.71 MG/DL (ref 0.52–1.04)
DIFFERENTIAL METHOD BLD: NORMAL
EOSINOPHIL # BLD AUTO: 0.2 10E9/L (ref 0–0.7)
EOSINOPHIL NFR BLD AUTO: 3.7 %
ERYTHROCYTE [DISTWIDTH] IN BLOOD BY AUTOMATED COUNT: 11.4 % (ref 10–15)
GFR SERPL CREATININE-BSD FRML MDRD: >90 ML/MIN/{1.73_M2}
GLUCOSE SERPL-MCNC: 89 MG/DL (ref 70–99)
HCT VFR BLD AUTO: 37 % (ref 35–47)
HGB BLD-MCNC: 12.1 G/DL (ref 11.7–15.7)
IMM GRANULOCYTES # BLD: 0 10E9/L (ref 0–0.4)
IMM GRANULOCYTES NFR BLD: 0.3 %
LYMPHOCYTES # BLD AUTO: 2.2 10E9/L (ref 0.8–5.3)
LYMPHOCYTES NFR BLD AUTO: 35.4 %
MCH RBC QN AUTO: 31.7 PG (ref 26.5–33)
MCHC RBC AUTO-ENTMCNC: 32.7 G/DL (ref 31.5–36.5)
MCV RBC AUTO: 97 FL (ref 78–100)
MONOCYTES # BLD AUTO: 0.6 10E9/L (ref 0–1.3)
MONOCYTES NFR BLD AUTO: 9.6 %
NEUTROPHILS # BLD AUTO: 3.1 10E9/L (ref 1.6–8.3)
NEUTROPHILS NFR BLD AUTO: 50.3 %
NRBC # BLD AUTO: 0 10*3/UL
NRBC BLD AUTO-RTO: 0 /100
PLATELET # BLD AUTO: 266 10E9/L (ref 150–450)
POTASSIUM SERPL-SCNC: 3.7 MMOL/L (ref 3.4–5.3)
RBC # BLD AUTO: 3.82 10E12/L (ref 3.8–5.2)
SODIUM SERPL-SCNC: 138 MMOL/L (ref 133–144)
WBC # BLD AUTO: 6.2 10E9/L (ref 4–11)

## 2019-11-19 PROCEDURE — 36415 COLL VENOUS BLD VENIPUNCTURE: CPT | Performed by: PHYSICIAN ASSISTANT

## 2019-11-19 PROCEDURE — 25000132 ZZH RX MED GY IP 250 OP 250 PS 637: Performed by: NURSE PRACTITIONER

## 2019-11-19 PROCEDURE — 80048 BASIC METABOLIC PNL TOTAL CA: CPT | Performed by: PHYSICIAN ASSISTANT

## 2019-11-19 PROCEDURE — 99207 ZZC APP CREDIT; MD BILLING SHARED VISIT: CPT | Performed by: PHYSICIAN ASSISTANT

## 2019-11-19 PROCEDURE — 25000125 ZZHC RX 250: Performed by: PHYSICIAN ASSISTANT

## 2019-11-19 PROCEDURE — 25000132 ZZH RX MED GY IP 250 OP 250 PS 637: Performed by: PHYSICIAN ASSISTANT

## 2019-11-19 PROCEDURE — 12000001 ZZH R&B MED SURG/OB UMMC

## 2019-11-19 PROCEDURE — 85025 COMPLETE CBC W/AUTO DIFF WBC: CPT | Performed by: PHYSICIAN ASSISTANT

## 2019-11-19 PROCEDURE — 74019 RADEX ABDOMEN 2 VIEWS: CPT

## 2019-11-19 PROCEDURE — 99233 SBSQ HOSP IP/OBS HIGH 50: CPT | Performed by: INTERNAL MEDICINE

## 2019-11-19 RX ORDER — LORAZEPAM 0.5 MG/1
0.5 TABLET ORAL 2 TIMES DAILY PRN
Status: DISCONTINUED | OUTPATIENT
Start: 2019-11-19 | End: 2019-11-20

## 2019-11-19 RX ORDER — BISACODYL 5 MG
10 TABLET, DELAYED RELEASE (ENTERIC COATED) ORAL ONCE
Status: COMPLETED | OUTPATIENT
Start: 2019-11-19 | End: 2019-11-19

## 2019-11-19 RX ORDER — MAGNESIUM CARB/ALUMINUM HYDROX 105-160MG
296 TABLET,CHEWABLE ORAL ONCE
Status: DISCONTINUED | OUTPATIENT
Start: 2019-11-19 | End: 2019-11-22 | Stop reason: HOSPADM

## 2019-11-19 RX ADMIN — METHYLCELLULOSE 500 MG: 500 TABLET ORAL at 11:47

## 2019-11-19 RX ADMIN — PANTOPRAZOLE SODIUM 40 MG: 40 TABLET, DELAYED RELEASE ORAL at 08:04

## 2019-11-19 RX ADMIN — LORAZEPAM 1 MG: 1 TABLET ORAL at 14:31

## 2019-11-19 RX ADMIN — MIRTAZAPINE 15 MG: 15 TABLET, FILM COATED ORAL at 21:18

## 2019-11-19 RX ADMIN — LORAZEPAM 1 MG: 1 TABLET ORAL at 08:04

## 2019-11-19 RX ADMIN — POTASSIUM CHLORIDE 20 MEQ: 750 TABLET, EXTENDED RELEASE ORAL at 02:03

## 2019-11-19 RX ADMIN — LIDOCAINE HYDROCHLORIDE: 20 JELLY TOPICAL at 09:39

## 2019-11-19 RX ADMIN — HYDROCORTISONE: 1 CREAM TOPICAL at 19:50

## 2019-11-19 RX ADMIN — POLYETHYLENE GLYCOL 3350 17 G: 17 POWDER, FOR SOLUTION ORAL at 14:31

## 2019-11-19 RX ADMIN — POLYETHYLENE GLYCOL 3350 17 G: 17 POWDER, FOR SOLUTION ORAL at 08:04

## 2019-11-19 RX ADMIN — BISACODYL 10 MG: 5 TABLET, COATED ORAL at 09:21

## 2019-11-19 ASSESSMENT — ACTIVITIES OF DAILY LIVING (ADL)
ADLS_ACUITY_SCORE: 10

## 2019-11-19 NOTE — PLAN OF CARE
Shift: [id number]  Skin: intact.   Respiratory: No shortness of breath, lung sounds clear.   Cardiovascular: normal rate and rhythm. No chest pain.   Gastrointestinal: incontinent, small loose bowel movements x 2.   Genitourinary: no issues voiding.   Musculoskeletal: no weakness, ambulating independently.   PIV: no access, team aware.     Patient alert and oriented x 4. Denies any abdominal pain. Minimal neck pain, declined any PRN medications- relief with heat packs. Pt refused cream for hemorroids, pt given barrier cream. VSS. Pt independent, calls appropriately. Will continue to monitor.
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
## 2019-11-19 NOTE — PROGRESS NOTES
Care Coordinator - Discharge Planning    Admission Date/Time:  11/14/2019  Attending MD:  Neelam att. providers found     Data  Chart reviewed, discussed with interdisciplinary team.   Patient was admitted for:   1. Obstipation    2. Slow transit constipation    3. Urinary retention    4. Autism spectrum disorder         Assessment   Concerns with insurance coverage for discharge needs: None.  Current Living Situation: Patient lives in a group home; she is her own decision maker.      [facility] [address]    Gabrielle,  Supervisor [phone]  Fax [phone]    Support System: Supportive  Services Involved: Group [facility] [facility]  - Karen (Ph: [phone])  Transportation at Discharge: TBD  Transportation to Medical Appointments:  - Name of caregiver: Self/ Staff  Barriers to Discharge: Medical stability       Coordination of Care   Tried to contact [facility] to confirm patient may return this morning (Ph: [phone]) no answer (unable to leave voicemail, no option to do so).    Contacted , Gabrielle (Ph: [phone]) confirmed patient may return. Gabrielle stated  staff is unable to transport. Gabrielle also stated an afternoon ride is best as staff does not arrive at that  until 2pm.     Plan dc tomorrow afternoon        Plan  Anticipated Discharge Date: Tomorrow Wed 11/20/19  Anticipated Discharge Plan:  Return to       Dayna Castillo RN, BSN, PHN  Internal Medicine Care Coordinator  [facility]  Desk Phone: [phone]  Pager: [phone]    To contact Weekend [facility], dial [phone] and enter job code [phone] at prompt.   This pager can not be contacted by text page or outside line.

## 2019-11-19 NOTE — PLAN OF CARE
Shift: 2233-0026  Skin: intact.   Respiratory: No shortness of breath, lung sounds clear.   Cardiovascular: normal rate and rhythm. No chest pain.   Gastrointestinal: incontinent, Medium loose bowel movements x 1.   Genitourinary: no issues voiding.   Musculoskeletal: no weakness, ambulating independently.   PIV: no access, team aware.      Patient alert and oriented x 4. VSS. Denies any abdominal pain, declined any PRN medications- relief with heat packs. Pt refused cream for hemorrhoids. Pt independent, calls appropriately. Pt ambulated in the hallway during shift.

## 2019-11-19 NOTE — PROVIDER NOTIFICATION
Provider paged regarding new order for one time enema and PRN order of IV Ativan to be given prior to enema. Patient does not have IV access and refuses a new IV to be placed. Provider will place order for PRN oral Ativan to give prior to enema.

## 2019-11-19 NOTE — PROGRESS NOTES
GASTROENTEROLOGY PROGRESS NOTE       Patient Summary   Brianne Colunga is a 29 year old female with a past medical history of Autism spectrum disorder, bipolar disorder, MDD, anxiety, IBS (mixed constipation/diarrhea), chronic constipation from slow transit (based on Sitz Marker study), and hx of chronic encopresis w/incontince dating back to her childhood who presents with abdominal pain in the setting of constipation s/p aggressive bowel regimen and multiple trials of mechanical fecal disimpaction.        ASSESSMENT AND RECOMMENDATIONS:   #Constipation, chronic  #Stercolar colitis from severe obstipation   Abdominal x-ray from 11/18/19 with decreased stool burden, but with persistent prominent rectal stool. Patient denies abdominal pain and she is passing formed stools. Plan is to continue with aggressive bowel regimen and now that she is eating solid foods patient will benefit repeat x-ray to confirm obstipation is resolving.     Recommendations  --Soft diet   --Repeat abdominal x-ray in the afternoon to check the progress of obstipation   --Optimize electrolytes (Mg 2, K 4, and Ca 8)  --Ambulate patient frequently, at least x4/day   --Miralax TID and can be increased if patient not having stool   --Continue with home fiber as patient is worried of diarrhea   --Daily enema if patient not stooling   --Encourage fluid intake   --Outpatient GI follow up for chronic constipation and colorectal surgery for pelvic floor dysfunction evaluation and management ([email or web address])  --GI will follow    Pt care plan discussed with Dr. Martin, GI staff physician. Thank you for involving us in this patient's care. Please do not hesitate to contact the GI service with any questions or concerns.    YAS Barnes [facility]  Department of Gastroenterology   P: [phone]  Subjective\events within the 24 hours:   Patient's abdominal pain has resolved and patient is tolerating diet. She lives in group home  "and mostly manages her bowel regimens and she does not like diarrhea, which is the reason she avoids bowel regimen.     4 point ROS performed and negative unless noted above.           Medications:     Current Facility-Administered Medications   Medication     acetaminophen (TYLENOL) tablet 1,000 mg     bisacodyl (DULCOLAX) EC tablet 10 mg     hydrocortisone (CORTAID) 1 % cream     lidocaine (XYLOCAINE) 2 % external gel     LORazepam (ATIVAN) injection 1 mg     LORazepam (ATIVAN) tablet 1 mg     magnesium sulfate 2 g in NS intermittent infusion (PharMEDium or [facility] Cmpd)     magnesium sulfate 4 g in 100 mL sterile water (premade)     melatonin tablet 3 mg     methylcellulose (CITRUCEL) tablet 500 mg     mirtazapine (REMERON) tablet 15 mg     naloxone (NARCAN) injection 0.1-0.4 mg     ondansetron (ZOFRAN-ODT) ODT tab 4 mg    Or     ondansetron (ZOFRAN) injection 4 mg     pantoprazole (PROTONIX) EC tablet 40 mg     polyethylene glycol (MIRALAX/GLYCOLAX) Packet 17 g     potassium chloride (KLOR-CON) Packet 20-40 mEq     potassium chloride 10 mEq in 100 mL intermittent infusion with 10 mg lidocaine     potassium chloride 10 mEq in 100 mL sterile water intermittent infusion (premix)     potassium chloride 20 mEq in 50 mL intermittent infusion     potassium chloride ER (K-DUR/KLOR-CON M) CR tablet 20-40 mEq        Physical Exam   Blood pressure 96/58, pulse 65, temperature 98  F (36.7  C), temperature source Oral, resp. rate 18, height 1.626 m (5' 4\"), SpO2 100 %.    Constitutional: , no acute distress  Eyes: Sclera anicteric/injected  HEENT: Normal oropharynx without ulcers or exudate, mucus membranes moist  CV: RRR, no m/r/g  Respiratory: CTAB  Abd: Mild distension, hypoactive BS, mild TTP throughout, no rebound or guarding   Rectal: Firm stool felt on initial entry into rectal vault  Skin: warm, perfused  Neuro: AAO x 3    Data   Current Labs  CBC  Recent Labs   Lab 11/19/19  0831 11/18/19  0602 11/17/19  0659 " 11/16/19  0608   WBC 6.2 9.4 11.8* 14.1*   RBC 3.82 3.34* 3.77* 3.88   HGB 12.1 10.6* 11.9 12.3   HCT 37.0 32.0* 35.2 36.4   MCV 97 96 93 94   MCH 31.7 31.7 31.6 31.7   MCHC 32.7 33.1 33.8 33.8   RDW 11.4 11.2 11.1 11.0    238 245 230     BMP  Recent Labs   Lab 11/19/19  0831 11/18/19  0602 11/17/19  0659 11/16/19  0608 11/15/19  0453 11/14/19  1655    142 138 138 139 137   POTASSIUM 3.7 3.5 3.5 3.7 3.8 3.1*   CHLORIDE 104 109 106 104 108 100   CO2 31 27 26 27 25 32   ANIONGAP 3 6 6 6 6 5   GLC 89 96 101* 92 117* 146*   BUN 13 13 5* 6* 11 17   CR 0.71 0.61 0.57 0.64 0.52 0.76   GFRESTIMATED >90 >90 >90 >90 >90 >90   GFRESTBLACK >90 >90 >90 >90 >90 >90   BOB 8.8 8.3* 8.7 8.8 7.6* 9.5   MAG  --   --   --  2.4*  --  2.7*   PHOS  --   --   --   --  2.3*  --       INRNo lab results found in last 7 days.  Liver panel  Recent Labs   Lab 11/17/19  0659 11/16/19  0608 11/15/19  0453   PROTTOTAL 7.1 7.2 6.6*   ALBUMIN 3.2* 3.3* 3.2*   BILITOTAL 0.7 0.8 0.5   ALKPHOS 64 69 55   AST 9 5 12   ALT 14 15 15          HPI:   Brianne Colunga is a 29 year old female with a PMHX significant for Autism spectrum disorder, bipolar disorder, MDD, anxiety, chronic constipation (slow transit, based on Sitz Marker study), and chronic fecal incontinence who presents with abdominal pain in the setting of constipation.      Prior to admission the patient reports that for the day prior to admission she had lower abdominal cramping. She has been having ongoing issues with constipation for years. She sits on the toilet for 60 minutes at a time, strains with bowel movements and has 2 bowel movements per week. Denies any nausea or emesis. She states she is scared of having bowel movements because when she gets this constipated it hurts. She has been trying to take citrucel TID, but hasn't gotten down a bowel regimen fully. In the setting of constipation, she also has issues with incontinence of stool and urine and difficulty initiating  her urinary stream. Denies fevers or chills.      Of note, she has had chronic constipation since at least 2015. She has undergone a colonoscopy in 2016, which was normal per report. She had a Sitz Marker study which showed slow colonic transit. She was supposed to undergo further pelvic floor PT and testing, but has not done so yet. She used to live in [place] (where all her sibling and parents live), but now she lives with a roommate in [place] (moved her for theater and to be in a big city) and lives in a group home. She has not established care with a GI provider in [place]. Of note, she was seen by CRS in the clinic on 6/20/19. It was recommended that she start fiber supplement and Citrucel once a day and titrate up to TID. She was also referred to Pelvic floor PT for biofeedback training due to her fecal incontinence.

## 2019-11-19 NOTE — PLAN OF CARE
"/56 (BP Location: Right arm)   Pulse 65   Temp 98.4  F (36.9  C) (Oral)   Resp 18   Ht 1.626 m (5' 4\")   SpO2 94%   BMI 19.48 kg/m      Pt A&Ox4. VSS. Denies abdominal pain. No c/o nausea. Up independently. Potassium replaced per protocol and recheck with morning labs. No BMs overnight. Pt slept throughout the night. Call light within reach, able to make needs known. Nurse will continue to monitor.  "

## 2019-11-20 ENCOUNTER — PATIENT OUTREACH (OUTPATIENT)
Dept: GASTROENTEROLOGY | Facility: CLINIC | Age: 29
End: 2019-11-20

## 2019-11-20 LAB
ANION GAP SERPL CALCULATED.3IONS-SCNC: 5 MMOL/L (ref 3–14)
BASOPHILS # BLD AUTO: 0.1 10E9/L (ref 0–0.2)
BASOPHILS NFR BLD AUTO: 0.8 %
BUN SERPL-MCNC: 18 MG/DL (ref 7–30)
CALCIUM SERPL-MCNC: 8.8 MG/DL (ref 8.5–10.1)
CHLORIDE SERPL-SCNC: 106 MMOL/L (ref 94–109)
CO2 SERPL-SCNC: 29 MMOL/L (ref 20–32)
CREAT SERPL-MCNC: 0.67 MG/DL (ref 0.52–1.04)
DIFFERENTIAL METHOD BLD: ABNORMAL
EOSINOPHIL # BLD AUTO: 0.3 10E9/L (ref 0–0.7)
EOSINOPHIL NFR BLD AUTO: 4.5 %
ERYTHROCYTE [DISTWIDTH] IN BLOOD BY AUTOMATED COUNT: 11.1 % (ref 10–15)
GFR SERPL CREATININE-BSD FRML MDRD: >90 ML/MIN/{1.73_M2}
GLUCOSE SERPL-MCNC: 92 MG/DL (ref 70–99)
HCT VFR BLD AUTO: 36.1 % (ref 35–47)
HGB BLD-MCNC: 11.7 G/DL (ref 11.7–15.7)
IMM GRANULOCYTES # BLD: 0 10E9/L (ref 0–0.4)
IMM GRANULOCYTES NFR BLD: 0.3 %
LYMPHOCYTES # BLD AUTO: 2.5 10E9/L (ref 0.8–5.3)
LYMPHOCYTES NFR BLD AUTO: 40.9 %
MAGNESIUM SERPL-MCNC: 2.5 MG/DL (ref 1.6–2.3)
MCH RBC QN AUTO: 31.5 PG (ref 26.5–33)
MCHC RBC AUTO-ENTMCNC: 32.4 G/DL (ref 31.5–36.5)
MCV RBC AUTO: 97 FL (ref 78–100)
MONOCYTES # BLD AUTO: 0.7 10E9/L (ref 0–1.3)
MONOCYTES NFR BLD AUTO: 10.9 %
NEUTROPHILS # BLD AUTO: 2.6 10E9/L (ref 1.6–8.3)
NEUTROPHILS NFR BLD AUTO: 42.6 %
NRBC # BLD AUTO: 0 10*3/UL
NRBC BLD AUTO-RTO: 0 /100
PLATELET # BLD AUTO: 287 10E9/L (ref 150–450)
POTASSIUM SERPL-SCNC: 4 MMOL/L (ref 3.4–5.3)
RBC # BLD AUTO: 3.71 10E12/L (ref 3.8–5.2)
SODIUM SERPL-SCNC: 139 MMOL/L (ref 133–144)
WBC # BLD AUTO: 6.2 10E9/L (ref 4–11)

## 2019-11-20 PROCEDURE — 25000132 ZZH RX MED GY IP 250 OP 250 PS 637: Performed by: PHYSICIAN ASSISTANT

## 2019-11-20 PROCEDURE — 99207 ZZC APP CREDIT; MD BILLING SHARED VISIT: CPT | Performed by: PHYSICIAN ASSISTANT

## 2019-11-20 PROCEDURE — 85025 COMPLETE CBC W/AUTO DIFF WBC: CPT | Performed by: PHYSICIAN ASSISTANT

## 2019-11-20 PROCEDURE — 40000141 ZZH STATISTIC PERIPHERAL IV START W/O US GUIDANCE

## 2019-11-20 PROCEDURE — 12000001 ZZH R&B MED SURG/OB UMMC

## 2019-11-20 PROCEDURE — 83735 ASSAY OF MAGNESIUM: CPT | Performed by: PHYSICIAN ASSISTANT

## 2019-11-20 PROCEDURE — 80048 BASIC METABOLIC PNL TOTAL CA: CPT | Performed by: PHYSICIAN ASSISTANT

## 2019-11-20 PROCEDURE — 25000125 ZZHC RX 250: Performed by: PHYSICIAN ASSISTANT

## 2019-11-20 PROCEDURE — 25000132 ZZH RX MED GY IP 250 OP 250 PS 637: Performed by: NURSE PRACTITIONER

## 2019-11-20 PROCEDURE — 25000128 H RX IP 250 OP 636: Performed by: PHYSICIAN ASSISTANT

## 2019-11-20 PROCEDURE — 99233 SBSQ HOSP IP/OBS HIGH 50: CPT | Performed by: INTERNAL MEDICINE

## 2019-11-20 PROCEDURE — 36415 COLL VENOUS BLD VENIPUNCTURE: CPT | Performed by: PHYSICIAN ASSISTANT

## 2019-11-20 RX ORDER — LORAZEPAM 2 MG/ML
0.5 INJECTION INTRAMUSCULAR ONCE
Status: COMPLETED | OUTPATIENT
Start: 2019-11-20 | End: 2019-11-20

## 2019-11-20 RX ORDER — LIDOCAINE HYDROCHLORIDE 20 MG/ML
JELLY TOPICAL DAILY PRN
Status: DISCONTINUED | OUTPATIENT
Start: 2019-11-20 | End: 2019-11-22 | Stop reason: HOSPADM

## 2019-11-20 RX ADMIN — HYDROCORTISONE: 1 CREAM TOPICAL at 07:42

## 2019-11-20 RX ADMIN — LORAZEPAM 0.5 MG: 2 INJECTION, SOLUTION INTRAMUSCULAR; INTRAVENOUS at 10:49

## 2019-11-20 RX ADMIN — POLYETHYLENE GLYCOL 3350 17 G: 17 POWDER, FOR SOLUTION ORAL at 07:41

## 2019-11-20 RX ADMIN — DOCUSATE SODIUM 286 ML: 10 LIQUID ORAL at 11:12

## 2019-11-20 RX ADMIN — MIRTAZAPINE 15 MG: 15 TABLET, FILM COATED ORAL at 22:01

## 2019-11-20 RX ADMIN — GLYCERIN 1 SUPPOSITORY: 2 SUPPOSITORY RECTAL at 10:06

## 2019-11-20 RX ADMIN — METHYLCELLULOSE 500 MG: 500 TABLET ORAL at 20:43

## 2019-11-20 RX ADMIN — PANTOPRAZOLE SODIUM 40 MG: 40 TABLET, DELAYED RELEASE ORAL at 07:41

## 2019-11-20 RX ADMIN — LORAZEPAM 1 MG: 1 TABLET ORAL at 07:41

## 2019-11-20 RX ADMIN — HYDROCORTISONE: 1 CREAM TOPICAL at 20:42

## 2019-11-20 RX ADMIN — MELATONIN TAB 3 MG 3 MG: 3 TAB at 22:01

## 2019-11-20 RX ADMIN — LORAZEPAM 1 MG: 1 TABLET ORAL at 13:38

## 2019-11-20 RX ADMIN — LIDOCAINE HYDROCHLORIDE: 20 JELLY TOPICAL at 18:02

## 2019-11-20 ASSESSMENT — ACTIVITIES OF DAILY LIVING (ADL)
ADLS_ACUITY_SCORE: 10

## 2019-11-20 ASSESSMENT — PAIN DESCRIPTION - DESCRIPTORS: DESCRIPTORS: CRAMPING

## 2019-11-20 NOTE — PLAN OF CARE
"/65 (BP Location: Right arm)   Pulse 65   Temp 99  F (37.2  C) (Oral)   Resp 18   Ht 1.626 m (5' 4\")   SpO2 98%   BMI 19.48 kg/m    Patient up independently. Tolerating a regular diet. Patient had a shower this evening after having multiple loose and incontinent stools. Patient refused all her bowel medications. Patient took Remeron at bedtime, says it helps better than the Melatonin. Patient is independent with cares. She is alert and oriented and able to make needs known. Will continue to monitor.  "

## 2019-11-20 NOTE — PROGRESS NOTES
Patient reporting multiple loose stools, requiring linen change on bed and a shower. Patient at this time is refusing any further bowel medication. This RN has attempted a couple times but patient is reluctant and refusing. Will update provider and continue to monitor patient.

## 2019-11-20 NOTE — PROGRESS NOTES
GASTROENTEROLOGY PROGRESS NOTE       Patient Summary   Brianne Colunga is a 29 year old female with a past medical history of Autism spectrum disorder, bipolar disorder, MDD, anxiety, IBS (mixed constipation/diarrhea), chronic constipation from slow transit (based on Sitz Marker study), and hx of chronic encopresis w/incontince dating back to her childhood who presents with abdominal pain in the setting of constipation s/p aggressive bowel regimen and multiple trials of mechanical fecal disimpaction.        ASSESSMENT AND RECOMMENDATIONS:   #Constipation, chronic  #Stercolar colitis from severe obstipation   Abdominal x-ray from 11/19/19 showed building up stool burden again. Patient denies abdominal pain and she is passing liquid/soft stools. Plan is to continue with aggressive bowel regimen and repeat abdominal x-ray to check the status of obstipation. I suspect patient will need long term close follow up to prevent recurrent constipation.     Recommendations  --Soft diet   --Repeat abdominal x-ray tomorrow   --Optimize electrolytes (Mg 2, K 4, and Ca 8)  --Ambulate patient frequently, at least x4/day   --Miralax TID and can be increased if patient not having stool   --Continue with home fiber as patient is worried of diarrhea   --Daily enema and glycerin suppository if patient not stooling   --Magnesium citrate x1 dose  --Encourage fluid intake   --Outpatient GI follow up for chronic constipation and colorectal surgery for pelvic floor dysfunction evaluation and management ([email or web address])  --GI will follow    Pt care plan discussed with Dr. Martin, GI staff physician. Thank you for involving us in this patient's care. Please do not hesitate to contact the GI service with any questions or concerns.    YAS Barnes [facility]  Department of Gastroenterology   P: [phone]  Subjective\events within the 24 hours:   Patient denies abdominal pain, nausea or vomiting today. And no blood in  "stool. She is fearful of diarrhea and has been refusing all laxatives. Reassured that having fiber onboard helps to not have blow out diarrhea.     4 point ROS performed and negative unless noted above.           Medications:     Current Facility-Administered Medications   Medication     acetaminophen (TYLENOL) tablet 1,000 mg     bisacodyl (DULCOLAX) EC tablet 10 mg     hydrocortisone (CORTAID) 1 % cream     lidocaine (XYLOCAINE) 2 % external gel     lidocaine (XYLOCAINE) 2 % external gel     LORazepam (ATIVAN) tablet 1 mg     magnesium citrate solution 296 mL     magnesium sulfate 2 g in NS intermittent infusion (PharMEDium or [facility] Cmpd)     magnesium sulfate 4 g in 100 mL sterile water (premade)     melatonin tablet 3 mg     methylcellulose (CITRUCEL) tablet 500 mg     mirtazapine (REMERON) tablet 15 mg     naloxone (NARCAN) injection 0.1-0.4 mg     ondansetron (ZOFRAN-ODT) ODT tab 4 mg    Or     ondansetron (ZOFRAN) injection 4 mg     pantoprazole (PROTONIX) EC tablet 40 mg     pink lady enema (COMPOUNDED: docusate, magnesium citrate, mineral oil, sodium phosphate)     polyethylene glycol (MIRALAX/GLYCOLAX) Packet 17 g     potassium chloride (KLOR-CON) Packet 20-40 mEq     potassium chloride 10 mEq in 100 mL intermittent infusion with 10 mg lidocaine     potassium chloride 10 mEq in 100 mL sterile water intermittent infusion (premix)     potassium chloride 20 mEq in 50 mL intermittent infusion     potassium chloride ER (K-DUR/KLOR-CON M) CR tablet 20-40 mEq        Physical Exam   Blood pressure 106/71, pulse 91, temperature 97.7  F (36.5  C), temperature source Oral, resp. rate 16, height 1.626 m (5' 4\"), SpO2 99 %.    Constitutional: , no acute distress  Eyes: Sclera anicteric/injected  HEENT: Normal oropharynx without ulcers or exudate, mucus membranes moist  CV: RRR, no m/r/g  Respiratory: CTAB  Abd: Mild distension, hypoactive BS, mild TTP throughout, no rebound or guarding   Rectal: Firm stool felt on initial " entry into rectal vault  Skin: warm, perfused  Neuro: AAO x 3    Data   Current Labs  CBC  Recent Labs   Lab 11/20/19  0617 11/19/19  0831 11/18/19  0602 11/17/19  0659   WBC 6.2 6.2 9.4 11.8*   RBC 3.71* 3.82 3.34* 3.77*   HGB 11.7 12.1 10.6* 11.9   HCT 36.1 37.0 32.0* 35.2   MCV 97 97 96 93   MCH 31.5 31.7 31.7 31.6   MCHC 32.4 32.7 33.1 33.8   RDW 11.1 11.4 11.2 11.1    266 238 245     BMP  Recent Labs   Lab 11/20/19  0617 11/19/19  0831 11/18/19  0602 11/17/19  0659 11/16/19  0608 11/15/19  0453 11/14/19  1655    138 142 138 138 139 137   POTASSIUM 4.0 3.7 3.5 3.5 3.7 3.8 3.1*   CHLORIDE 106 104 109 106 104 108 100   CO2 29 31 27 26 27 25 32   ANIONGAP 5 3 6 6 6 6 5   GLC 92 89 96 101* 92 117* 146*   BUN 18 13 13 5* 6* 11 17   CR 0.67 0.71 0.61 0.57 0.64 0.52 0.76   GFRESTIMATED >90 >90 >90 >90 >90 >90 >90   GFRESTBLACK >90 >90 >90 >90 >90 >90 >90   BOB 8.8 8.8 8.3* 8.7 8.8 7.6* 9.5   MAG 2.5*  --   --   --  2.4*  --  2.7*   PHOS  --   --   --   --   --  2.3*  --       INRNo lab results found in last 7 days.  Liver panel  Recent Labs   Lab 11/17/19 0659 11/16/19  0608 11/15/19  0453   PROTTOTAL 7.1 7.2 6.6*   ALBUMIN 3.2* 3.3* 3.2*   BILITOTAL 0.7 0.8 0.5   ALKPHOS 64 69 55   AST 9 5 12   ALT 14 15 15          HPI:   Brianne Colunga is a 29 year old female with a PMHX significant for Autism spectrum disorder, bipolar disorder, MDD, anxiety, chronic constipation (slow transit, based on Sitz Marker study), and chronic fecal incontinence who presents with abdominal pain in the setting of constipation.      Prior to admission the patient reports that for the day prior to admission she had lower abdominal cramping. She has been having ongoing issues with constipation for years. She sits on the toilet for 60 minutes at a time, strains with bowel movements and has 2 bowel movements per week. Denies any nausea or emesis. She states she is scared of having bowel movements because when she gets this  constipated it hurts. She has been trying to take citrucel TID, but hasn't gotten down a bowel regimen fully. In the setting of constipation, she also has issues with incontinence of stool and urine and difficulty initiating her urinary stream. Denies fevers or chills.      Of note, she has had chronic constipation since at least 2015. She has undergone a colonoscopy in 2016, which was normal per report. She had a Sitz Marker study which showed slow colonic transit. She was supposed to undergo further pelvic floor PT and testing, but has not done so yet. She used to live in [place] (where all her sibling and parents live), but now she lives with a roommate in [place] (moved her for theater and to be in a big city) and lives in a group home. She has not established care with a GI provider in [place]. Of note, she was seen by CRS in the clinic on 6/20/19. It was recommended that she start fiber supplement and Citrucel once a day and titrate up to TID. She was also referred to Pelvic floor PT for biofeedback training due to her fecal incontinence.

## 2019-11-20 NOTE — PLAN OF CARE
"BP 94/55 (BP Location: Right arm)   Pulse 66   Temp 97.6  F (36.4  C) (Oral)   Resp 16   Ht 1.626 m (5' 4\")   SpO2 96%   BMI 19.48 kg/m      Pt A&Ox4. VSS. Denies abdominal pain and no c/o nausea. Up independently. No BMs overnight. Pt slept throughout the night. Call light within reach, able to make needs known. Nurse will continue to monitor.  "
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
## 2019-11-20 NOTE — TELEPHONE ENCOUNTER
Called pt to discuss upcoming appointment and rescheduling.   Both listed phone numbers are inactive with not in service messages.     [employer] message sent to attempt initiating contact. Writer's contact info included.

## 2019-11-21 ENCOUNTER — APPOINTMENT (OUTPATIENT)
Dept: GENERAL RADIOLOGY | Facility: CLINIC | Age: 29
DRG: 389 | End: 2019-11-21
Attending: PHYSICIAN ASSISTANT
Payer: COMMERCIAL

## 2019-11-21 PROCEDURE — 25000132 ZZH RX MED GY IP 250 OP 250 PS 637: Performed by: PHYSICIAN ASSISTANT

## 2019-11-21 PROCEDURE — 74018 RADEX ABDOMEN 1 VIEW: CPT

## 2019-11-21 PROCEDURE — 12000001 ZZH R&B MED SURG/OB UMMC

## 2019-11-21 PROCEDURE — 99233 SBSQ HOSP IP/OBS HIGH 50: CPT | Performed by: INTERNAL MEDICINE

## 2019-11-21 PROCEDURE — 99207 ZZC APP CREDIT; MD BILLING SHARED VISIT: CPT | Performed by: PHYSICIAN ASSISTANT

## 2019-11-21 PROCEDURE — 25000132 ZZH RX MED GY IP 250 OP 250 PS 637: Performed by: NURSE PRACTITIONER

## 2019-11-21 PROCEDURE — 25000125 ZZHC RX 250: Performed by: RADIOLOGY

## 2019-11-21 RX ORDER — BISACODYL 10 MG
10 SUPPOSITORY, RECTAL RECTAL ONCE
Status: DISCONTINUED | OUTPATIENT
Start: 2019-11-21 | End: 2019-11-22 | Stop reason: HOSPADM

## 2019-11-21 RX ORDER — POLYETHYLENE GLYCOL 3350 17 G/17G
238 POWDER, FOR SOLUTION ORAL ONCE
Status: COMPLETED | OUTPATIENT
Start: 2019-11-21 | End: 2019-11-21

## 2019-11-21 RX ORDER — LIDOCAINE HYDROCHLORIDE 20 MG/ML
JELLY TOPICAL ONCE
Status: COMPLETED | OUTPATIENT
Start: 2019-11-21 | End: 2019-11-21

## 2019-11-21 RX ORDER — CALCIUM CARBONATE 500 MG/1
500 TABLET, CHEWABLE ORAL DAILY PRN
Status: DISCONTINUED | OUTPATIENT
Start: 2019-11-21 | End: 2019-11-22 | Stop reason: HOSPADM

## 2019-11-21 RX ORDER — LIDOCAINE HYDROCHLORIDE 20 MG/ML
JELLY TOPICAL ONCE
Status: DISCONTINUED | OUTPATIENT
Start: 2019-11-21 | End: 2019-11-22 | Stop reason: HOSPADM

## 2019-11-21 RX ORDER — LORAZEPAM 2 MG/ML
0.5 INJECTION INTRAMUSCULAR DAILY PRN
Status: DISCONTINUED | OUTPATIENT
Start: 2019-11-21 | End: 2019-11-22 | Stop reason: HOSPADM

## 2019-11-21 RX ADMIN — MIRTAZAPINE 15 MG: 15 TABLET, FILM COATED ORAL at 21:50

## 2019-11-21 RX ADMIN — METHYLCELLULOSE 500 MG: 500 TABLET ORAL at 09:49

## 2019-11-21 RX ADMIN — LORAZEPAM 1 MG: 1 TABLET ORAL at 09:49

## 2019-11-21 RX ADMIN — MELATONIN TAB 3 MG 3 MG: 3 TAB at 21:50

## 2019-11-21 RX ADMIN — LIDOCAINE HYDROCHLORIDE 1 ML: 20 JELLY TOPICAL at 13:49

## 2019-11-21 RX ADMIN — POLYETHYLENE GLYCOL 3350 17 G: 17 POWDER, FOR SOLUTION ORAL at 09:50

## 2019-11-21 RX ADMIN — POLYETHYLENE GLYCOL 3350 238 G: 17 POWDER, FOR SOLUTION ORAL at 12:56

## 2019-11-21 RX ADMIN — LORAZEPAM 1 MG: 1 TABLET ORAL at 16:10

## 2019-11-21 RX ADMIN — PANTOPRAZOLE SODIUM 40 MG: 40 TABLET, DELAYED RELEASE ORAL at 09:49

## 2019-11-21 RX ADMIN — CALCIUM CARBONATE (ANTACID) CHEW TAB 500 MG 500 MG: 500 CHEW TAB at 18:32

## 2019-11-21 ASSESSMENT — ACTIVITIES OF DAILY LIVING (ADL)
ADLS_ACUITY_SCORE: 10

## 2019-11-21 NOTE — PROGRESS NOTES
Neuro: A&Ox4. Anxious. Labile. Tearful. Follows commands.  Cardiac: Afebrile, VSS.            Respiratory: RA    GI/: Voiding spontaneously. 2 loose BM's this shift. Stool is loose and brown appears to be leaking around significant stool burden. No significant sized stool this shift.   Diet/appetite: Soft diet. Tolerating it well.  Activity: Up ad jj, independent.   Pain: Some abdominal cramping and discomfort.  Skin: Intact.  Lines: PIV SL.     Several techniques to encourage bowel movement used including medication regiment, ambulation, hot showers, Hot K-pad in use. No insignificant stool noted. No signs of perforation. Will continue to monitor.

## 2019-11-21 NOTE — PROVIDER NOTIFICATION
Text page re: Pt labs have not been checked since 11/20. K was 4. Would you like a lab recheck or wait until morning?
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
## 2019-11-21 NOTE — PROGRESS NOTES
GASTROENTEROLOGY PROGRESS NOTE       Patient Summary   Brianne Colunga is a 29 year old female with a past medical history of Autism spectrum disorder, bipolar disorder, MDD, anxiety, IBS (mixed constipation/diarrhea), chronic constipation from slow transit (based on Sitz Marker study), and hx of chronic encopresis w/incontince dating back to her childhood who presents with abdominal pain in the setting of constipation s/p aggressive bowel regimen and multiple trials of mechanical fecal disimpaction.        ASSESSMENT AND RECOMMENDATIONS:   #Constipation, chronic  #Stercolar colitis from severe obstipation   Abdominal x-ray from 11/19/19 showed building up stool burden again. Patient denies abdominal pain and she is passing liquid/soft stools. Plan is to continue with aggressive bowel regimen and repeat abdominal x-ray to check the status of obstipation. I suspect patient will need long term close follow up to prevent recurrent constipation.     Recommendations  --Soft diet   --Optimize electrolytes (Mg 2, K 4, and Ca 8)  --Ambulate patient frequently, at least x4/day  --Okay to repeat gastrografin enema     --Miralax bowel prep (238 grams of Miralax with 64 ounces of powerade)  --Okay to continue with home fiber as patient is worried of diarrhea   --Outpatient GI follow up for chronic constipation and colorectal surgery for pelvic floor dysfunction evaluation and management ([email or web address])  --GI will follow    Pt care plan discussed with Dr. Martin, GI staff physician. Thank you for involving us in this patient's care. Please do not hesitate to contact the GI service with any questions or concerns.    [facility]  Department of Gastroenterology   P: [phone]  Subjective\events within the 24 hours:   Patient denies abdominal pain, nausea or vomiting today. And no blood in stool, last stool was yesterday morning. She is fearful of diarrhea and has been refusing all laxatives.  "Patient in tears because she is worried about diarrhea, reassured that having fiber onboard helps to not have blow out diarrhea.     4 point ROS performed and negative unless noted above.           Medications:     Current Facility-Administered Medications   Medication     acetaminophen (TYLENOL) tablet 1,000 mg     bisacodyl (DULCOLAX) EC tablet 10 mg     hydrocortisone (CORTAID) 1 % cream     lidocaine (XYLOCAINE) 2 % external gel     lidocaine (XYLOCAINE) 2 % external gel     lidocaine (XYLOCAINE) 2 % external gel     LORazepam (ATIVAN) tablet 1 mg     magnesium citrate solution 296 mL     magnesium sulfate 2 g in NS intermittent infusion ([facility] or [facility])     magnesium sulfate 4 g in 100 mL sterile water (premade)     melatonin tablet 3 mg     methylcellulose (CITRUCEL) tablet 500 mg     mirtazapine (REMERON) tablet 15 mg     naloxone (NARCAN) injection 0.1-0.4 mg     ondansetron (ZOFRAN-ODT) ODT tab 4 mg    Or     ondansetron (ZOFRAN) injection 4 mg     pantoprazole (PROTONIX) EC tablet 40 mg     pink lady enema (COMPOUNDED: docusate, magnesium citrate, mineral oil, sodium phosphate)     polyethylene glycol (MIRALAX/GLYCOLAX) Packet 17 g     potassium chloride (KLOR-CON) Packet 20-40 mEq     potassium chloride 10 mEq in 100 mL intermittent infusion with 10 mg lidocaine     potassium chloride 10 mEq in 100 mL sterile water intermittent infusion (premix)     potassium chloride 20 mEq in 50 mL intermittent infusion     potassium chloride ER (K-DUR/KLOR-CON M) CR tablet 20-40 mEq        Physical Exam   Blood pressure 97/45, pulse 66, temperature 97.3  F (36.3  C), temperature source Oral, resp. rate 16, height 1.626 m (5' 4\"), SpO2 98 %.    Constitutional: , no acute distress  Eyes: Sclera anicteric/injected  HEENT: Normal oropharynx without ulcers or exudate, mucus membranes moist  CV: RRR, no m/r/g  Respiratory: CTAB  Abd: Mild distension, hypoactive BS, mild TTP throughout, no rebound or guarding   Rectal: " Firm stool felt on initial entry into rectal vault  Skin: warm, perfused  Neuro: AAO x 3    Data   Current Labs  CBC  Recent Labs   Lab 11/20/19  0617 11/19/19  0831 11/18/19  0602 11/17/19  0659   WBC 6.2 6.2 9.4 11.8*   RBC 3.71* 3.82 3.34* 3.77*   HGB 11.7 12.1 10.6* 11.9   HCT 36.1 37.0 32.0* 35.2   MCV 97 97 96 93   MCH 31.5 31.7 31.7 31.6   MCHC 32.4 32.7 33.1 33.8   RDW 11.1 11.4 11.2 11.1    266 238 245     BMP  Recent Labs   Lab 11/20/19  0617 11/19/19  0831 11/18/19  0602 11/17/19  0659 11/16/19  0608 11/15/19  0453 11/14/19  1655    138 142 138 138 139 137   POTASSIUM 4.0 3.7 3.5 3.5 3.7 3.8 3.1*   CHLORIDE 106 104 109 106 104 108 100   CO2 29 31 27 26 27 25 32   ANIONGAP 5 3 6 6 6 6 5   GLC 92 89 96 101* 92 117* 146*   BUN 18 13 13 5* 6* 11 17   CR 0.67 0.71 0.61 0.57 0.64 0.52 0.76   GFRESTIMATED >90 >90 >90 >90 >90 >90 >90   GFRESTBLACK >90 >90 >90 >90 >90 >90 >90   BOB 8.8 8.8 8.3* 8.7 8.8 7.6* 9.5   MAG 2.5*  --   --   --  2.4*  --  2.7*   PHOS  --   --   --   --   --  2.3*  --       INRNo lab results found in last 7 days.  Liver panel  Recent Labs   Lab 11/17/19 0659 11/16/19  0608 11/15/19  0453   PROTTOTAL 7.1 7.2 6.6*   ALBUMIN 3.2* 3.3* 3.2*   BILITOTAL 0.7 0.8 0.5   ALKPHOS 64 69 55   AST 9 5 12   ALT 14 15 15          HPI:   Brianne Colunga is a 29 year old female with a PMHX significant for Autism spectrum disorder, bipolar disorder, MDD, anxiety, chronic constipation (slow transit, based on Sitz Marker study), and chronic fecal incontinence who presents with abdominal pain in the setting of constipation.      Prior to admission the patient reports that for the day prior to admission she had lower abdominal cramping. She has been having ongoing issues with constipation for years. She sits on the toilet for 60 minutes at a time, strains with bowel movements and has 2 bowel movements per week. Denies any nausea or emesis. She states she is scared of having bowel movements because  when she gets this constipated it hurts. She has been trying to take citrucel TID, but hasn't gotten down a bowel regimen fully. In the setting of constipation, she also has issues with incontinence of stool and urine and difficulty initiating her urinary stream. Denies fevers or chills.      Of note, she has had chronic constipation since at least 2015. She has undergone a colonoscopy in 2016, which was normal per report. She had a Sitz Marker study which showed slow colonic transit. She was supposed to undergo further pelvic floor PT and testing, but has not done so yet. She used to live in [place] (where all her sibling and parents live), but now she lives with a roommate in [place] (moved her for theater and to be in a big city) and lives in a group home. She has not established care with a GI provider in [place]. Of note, she was seen by CRS in the clinic on 6/20/19. It was recommended that she start fiber supplement and Citrucel once a day and titrate up to TID. She was also referred to Pelvic floor PT for biofeedback training due to her fecal incontinence.

## 2019-11-21 NOTE — PLAN OF CARE
"BP 92/52 (BP Location: Right arm)   Pulse 65   Temp 98  F (36.7  C) (Oral)   Resp 16   Ht 1.626 m (5' 4\")   SpO2 95%   BMI 19.48 kg/m      Vitals: VSS  Activity: Independent, up ad jj   Pain: Denies pain  Neuro: A&Ox4. Follows commands.  Cardiac: WDL, no chest pain.  Respiratory: RA  GI/: Voiding spontaneously. No BMs this shift.  Diet: Soft diet. Tolerating.  Lines: PIV SL  "

## 2019-11-21 NOTE — PLAN OF CARE
"BP 97/45 (BP Location: Right arm)   Pulse 66   Temp 97.3  F (36.3  C) (Oral)   Resp 16   Ht 1.626 m (5' 4\")   SpO2 98%   BMI 19.48 kg/m      Pt is A&Ox4, VSS, and tolerating a soft diet. Pt is up independently and has been drinking her dose of 238g Miralax dissolved in Gatorade. Pt went down for her gastrografin enema in xray and refused to let xray do anything without the nurse present. Author tried comforting the pt and talking her through the procedure and the pt would not lay still d/t pain at insertion site. Provider notified, will order another pink lady enema. Pt ambulating around the unit, needs frequent encouragement to keep drinking Miralax solution. Nurse will continue to monitotr   "

## 2019-11-22 ENCOUNTER — APPOINTMENT (OUTPATIENT)
Dept: GENERAL RADIOLOGY | Facility: CLINIC | Age: 29
DRG: 389 | End: 2019-11-22
Attending: PHYSICIAN ASSISTANT
Payer: COMMERCIAL

## 2019-11-22 VITALS
RESPIRATION RATE: 16 BRPM | BODY MASS INDEX: 19.48 KG/M2 | HEART RATE: 69 BPM | HEIGHT: 64 IN | DIASTOLIC BLOOD PRESSURE: 61 MMHG | SYSTOLIC BLOOD PRESSURE: 102 MMHG | TEMPERATURE: 97.4 F | OXYGEN SATURATION: 97 %

## 2019-11-22 PROCEDURE — 74018 RADEX ABDOMEN 1 VIEW: CPT

## 2019-11-22 PROCEDURE — 25000132 ZZH RX MED GY IP 250 OP 250 PS 637: Performed by: PHYSICIAN ASSISTANT

## 2019-11-22 PROCEDURE — 99239 HOSP IP/OBS DSCHRG MGMT >30: CPT | Performed by: INTERNAL MEDICINE

## 2019-11-22 PROCEDURE — 25000132 ZZH RX MED GY IP 250 OP 250 PS 637: Performed by: NURSE PRACTITIONER

## 2019-11-22 PROCEDURE — 99207 ZZC APP CREDIT; MD BILLING SHARED VISIT: CPT | Performed by: PHYSICIAN ASSISTANT

## 2019-11-22 PROCEDURE — 25000128 H RX IP 250 OP 636: Performed by: PHYSICIAN ASSISTANT

## 2019-11-22 RX ORDER — MIRTAZAPINE 15 MG/1
15 TABLET, FILM COATED ORAL AT BEDTIME
Qty: 30 TABLET | Refills: 0 | Status: SHIPPED | OUTPATIENT
Start: 2019-11-22

## 2019-11-22 RX ORDER — LORAZEPAM 2 MG/ML
0.5 INJECTION INTRAMUSCULAR ONCE
Status: COMPLETED | OUTPATIENT
Start: 2019-11-22 | End: 2019-11-22

## 2019-11-22 RX ORDER — PANTOPRAZOLE SODIUM 40 MG/1
40 TABLET, DELAYED RELEASE ORAL
Qty: 30 TABLET | Refills: 0 | Status: SHIPPED | OUTPATIENT
Start: 2019-11-23

## 2019-11-22 RX ORDER — POLYETHYLENE GLYCOL 3350 17 G/17G
238 POWDER, FOR SOLUTION ORAL ONCE
Status: COMPLETED | OUTPATIENT
Start: 2019-11-22 | End: 2019-11-22

## 2019-11-22 RX ORDER — POLYETHYLENE GLYCOL 3350 17 G/17G
17 POWDER, FOR SOLUTION ORAL 3 TIMES DAILY
Qty: 1530 G | Refills: 0 | Status: SHIPPED | OUTPATIENT
Start: 2019-11-22 | End: 2019-12-22

## 2019-11-22 RX ADMIN — METHYLCELLULOSE 500 MG: 500 TABLET ORAL at 08:07

## 2019-11-22 RX ADMIN — POLYETHYLENE GLYCOL 3350 238 G: 17 POWDER, FOR SOLUTION ORAL at 10:41

## 2019-11-22 RX ADMIN — LORAZEPAM 1 MG: 1 TABLET ORAL at 08:07

## 2019-11-22 RX ADMIN — PANTOPRAZOLE SODIUM 40 MG: 40 TABLET, DELAYED RELEASE ORAL at 08:07

## 2019-11-22 RX ADMIN — LORAZEPAM 0.5 MG: 2 INJECTION INTRAMUSCULAR; INTRAVENOUS at 10:55

## 2019-11-22 RX ADMIN — POLYETHYLENE GLYCOL 3350 17 G: 17 POWDER, FOR SOLUTION ORAL at 08:08

## 2019-11-22 ASSESSMENT — ACTIVITIES OF DAILY LIVING (ADL)
ADLS_ACUITY_SCORE: 10
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
## 2019-11-22 NOTE — PLAN OF CARE
"VS: /61 (BP Location: Right arm)   Pulse 69   Temp 97.4  F (36.3  C) (Oral)   Resp 16   Ht 1.626 m (5' 4\")   SpO2 97%   BMI 19.48 kg/m      Pain: pt denies pain.  Neuro: A&Ox4  Cardiac: VSS  Respiratory: Breath sounds clear and equal bilaterally, denies SOB.  GI/Diet/Appetite: Intermittent abdominal cramping, stomach nondistended. Denies nausea. Pt had one medium loose bowel movement on shift.  : Voiding without difficulty x2.  LDA's: pt removed PIV and refused to get another placed. Will update team  Skin: CDI  Activity: Independent, ambulated hallways x1.       Pt is up independently and has been drinking her dose of 238g Miralax dissolved in Gatorade. Needs frequent encouragement to drink the Gatorade. Pt went down to xray for enema. One time dose of ativan was given prior to xray. Pt was unable to complete  and did not receive the enema, providers aware.                   "

## 2019-11-22 NOTE — PROGRESS NOTES
"Social Work Services Progress Note    Hospital Day: 9  Date of Initial Social Work Evaluation:  Not completed   Collaborated with:  Pt, charge RN, pt's REGULO Jones     Data:  SW following 29 year old female pt due housing concerns.     Intervention:  SW received message that pt does not feel safe at her current group home. Reviewed pt's chart. Pt has CADI REGULO Jones [phone].  Met with pt. Introduced self and role on treatment team. Pt reports she does not like her group home because she does not like her roommate and the people at the group home. Pt stated she feels she does not need the services which the group home provide. Pt reports she does not feel safe because it is \"[place] bad neighborhood.\" pt denied any safety concerns in the group home. Pt would like SW to find her affordable independent housing. Informed pt that affordable housing has a wait list and she would need to work with her CM regarding housing concerns. Pt reports she had student housing in the past and was able to afford the rent and got some assistance from a friend. SW asked pt if she had any friends or family that could assist her in paying/finding independent housing. Pt became tearful and stated she did not. SW asked pt if she has been working with CM on finding new housing, pt stated she has not been able to call her. Pt reports she would like to move to [facility] because she can \"get better services there.\" Asked pt if SW could speak with CM, pt agreeable.     SW spoke with REGULO Jones. Karen reports affordable independent housing has a 2-3 year wait list. Karen reports that pt owes group home $2000 and it has been recommended that pt get a rep-payee however pt refuses. Karen stated that she will work with pt on finding a new group home once pt discharges from the hospital.    Met with pt to discuss conversation Karen. Asked pt if she would like SW and pt to speak with Karen. Pt became tearful and stated she did not want to do that and " she would like to be discharged from the hospital today. Pt reports she would like to go to a homeless shelter until she can find new housing.   Updated charge RN, medical provider paged.     [id number]: Jacque HANSON met with pt. Pt agreeable to returning to group home. Jacque reports pt arranges her own transportation upon discharge.  Left message with pt's REGULO Jones that pt is discharging today.     Assessment:  Pt tearful during conversation.    Plan:    Anticipated Disposition:  Home, no needs identified    Barriers to d/c plan:  Medical stability    Follow Up:  SW available as needed.    SANDRA Calvert, [facility]  6D Adult Acute Care SW  Ph:[phone]  Pager [phone]  Unit [phone]
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
## 2019-11-22 NOTE — PROVIDER NOTIFICATION
Gold text page re: Pt refusing suppository and citrucel. Reports she will not take it tonight because of sleep disruption.

## 2019-11-22 NOTE — PLAN OF CARE
"VS: BP 96/54 (BP Location: Right arm)   Pulse 66   Temp 98.2  F (36.8  C) (Oral)   Resp 16   Ht 1.626 m (5' 4\")   SpO2 98%   BMI 19.48 kg/m    Pain: Pt reporting abdominal discomfort and chest pain described as \"burning\" after eating. Given PRN tums with relief.  Neuro: A&Ox4  Cardiac: VSS  Respiratory: Breath sounds clear and equal bilaterally, denies SOB.  GI/Diet/Appetite: Intermittent abdominal cramping, stomach nondistended. Denies nausea. No BM this shift.  : Voiding without difficulty x2.  LDA's: SL  Skin: CDI  Activity: Independent, ambulated hallways x1.    No BM. Pt refused scheduled suppository and citrucel due to fear of sleep disruption. Author explained benefit of taking the medications, pt continued to refuse. Provider paged. Call light within reach, able to make needs known. Will continue to monitor and follow POC.    "

## 2019-11-22 NOTE — PLAN OF CARE
Vitals: VSS  Neuro: A&O x4, pt sleeping comfortably.   Cardiac: WDL  Respiratory: WDL  Gastrointestinal: Pt has not bowel movement today (11/21/19), will continue to monitor  : WDL, pt voided during evening shift has not voided yet tonight.   Musculoskeletal: WDL   Comfort: Pt denies pain and is resting comfortably    Pt comfortable and denies pain. When asked if she needed anything she just wanted to sleep.

## 2019-11-22 NOTE — PROGRESS NOTES
Patient discharged home. Patient has all belongings, understands and agrees with discharge instructions.

## 2019-11-27 ENCOUNTER — TELEPHONE (OUTPATIENT)
Dept: GASTROENTEROLOGY | Facility: CLINIC | Age: 29
End: 2019-11-27

## 2019-11-27 NOTE — TELEPHONE ENCOUNTER
Called mother number and cannot leave a vm. Called the other 2 numbers on chart and they are both disconnected

## 2020-02-05 ENCOUNTER — PRE VISIT (OUTPATIENT)
Dept: GASTROENTEROLOGY | Facility: CLINIC | Age: 30
End: 2020-02-05

## 2020-03-11 ENCOUNTER — HEALTH MAINTENANCE LETTER (OUTPATIENT)
Age: 30
End: 2020-03-11

## 2021-01-03 ENCOUNTER — HEALTH MAINTENANCE LETTER (OUTPATIENT)
Age: 31
End: 2021-01-03

## 2021-10-10 ENCOUNTER — HEALTH MAINTENANCE LETTER (OUTPATIENT)
Age: 31
End: 2021-10-10

## 2022-01-29 ENCOUNTER — HEALTH MAINTENANCE LETTER (OUTPATIENT)
Age: 32
End: 2022-01-29

## 2022-09-18 ENCOUNTER — HEALTH MAINTENANCE LETTER (OUTPATIENT)
Age: 32
End: 2022-09-18

## 2023-05-07 ENCOUNTER — HEALTH MAINTENANCE LETTER (OUTPATIENT)
Age: 33
End: 2023-05-07

## (undated) RX ORDER — LIDOCAINE HYDROCHLORIDE 20 MG/ML
JELLY TOPICAL
Status: DISPENSED
Start: 2019-11-21